# Patient Record
Sex: FEMALE | Race: WHITE | NOT HISPANIC OR LATINO | Employment: FULL TIME | URBAN - METROPOLITAN AREA
[De-identification: names, ages, dates, MRNs, and addresses within clinical notes are randomized per-mention and may not be internally consistent; named-entity substitution may affect disease eponyms.]

---

## 2017-09-27 ENCOUNTER — OFFICE VISIT (OUTPATIENT)
Dept: URGENT CARE | Facility: PHYSICIAN GROUP | Age: 37
End: 2017-09-27
Payer: COMMERCIAL

## 2017-09-27 VITALS
WEIGHT: 215 LBS | SYSTOLIC BLOOD PRESSURE: 124 MMHG | RESPIRATION RATE: 16 BRPM | DIASTOLIC BLOOD PRESSURE: 76 MMHG | HEART RATE: 87 BPM | TEMPERATURE: 98.7 F | OXYGEN SATURATION: 98 %

## 2017-09-27 DIAGNOSIS — J06.9 VIRAL UPPER RESPIRATORY TRACT INFECTION: ICD-10-CM

## 2017-09-27 PROCEDURE — 99203 OFFICE O/P NEW LOW 30 MIN: CPT | Performed by: FAMILY MEDICINE

## 2017-09-27 RX ORDER — AZITHROMYCIN 250 MG/1
TABLET, FILM COATED ORAL
Qty: 6 TAB | Refills: 0 | Status: SHIPPED | OUTPATIENT
Start: 2017-09-27 | End: 2017-12-24

## 2017-09-27 ASSESSMENT — ENCOUNTER SYMPTOMS
EYE DISCHARGE: 0
SENSORY CHANGE: 0
SORE THROAT: 1
NAUSEA: 0
FEVER: 0
COUGH: 1
VOMITING: 0
HEADACHES: 1
ABDOMINAL PAIN: 0
SPUTUM PRODUCTION: 1
CHILLS: 0
DIARRHEA: 0
WHEEZING: 1
PALPITATIONS: 0

## 2017-09-27 NOTE — PROGRESS NOTES
Subjective:      Татьяна Velez is a 37 y.o. female who presents with URI (x4days, aches, chest congestion, wheezing worse x1day)            Subjective:     Татьяна Velez is a 37 y.o. female who presents for CC of symptoms of a URI, possible sinusitis. Symptoms include sinus and nasal congestion, sore throat, nasal blockage, post nasal drip, bilateral sinus pain, wheezing, productive cough, cough described as productive and productive of green sputum and green nasal discharge. Onset of symptoms was 3 days ago, gradually worsening since that time. She is drinking plenty of fluids.. Evaluation to date: none. Treatment to date: decongestants, antihistamines, cough suppressants, which have been somewhat effective                        Review of Systems   Constitutional: Negative for chills, fever and malaise/fatigue.   HENT: Positive for congestion and sore throat. Negative for ear pain and nosebleeds.    Eyes: Negative for discharge.   Respiratory: Positive for cough, sputum production and wheezing.    Cardiovascular: Negative for chest pain and palpitations.   Gastrointestinal: Negative for abdominal pain, diarrhea, nausea and vomiting.   Genitourinary: Negative for dysuria and urgency.   Skin: Negative for itching and rash.   Neurological: Positive for headaches. Negative for sensory change.      PMH:  has no past medical history on file.  MEDS:   Current Outpatient Prescriptions:   •  azithromycin (ZITHROMAX Z-CASSIDY) 250 MG Tab, Use as directed with food, Disp: 6 Tab, Rfl: 0  •  hydrochlorothiazide (HYDRODIURIL) 25 MG Tab, Take 25 mg by mouth every day., Disp: , Rfl:   •  metoprolol (LOPRESSOR) 25 MG Tab, Take 25 mg by mouth 2 times a day., Disp: , Rfl:   •  azithromycin (ZITHROMAX) 250 MG Tab, Take as directed, Disp: 6 Tab, Rfl: 0  •  ibuprofen (MOTRIN) 800 MG Tab, Take 1 Tab by mouth every 8 hours as needed., Disp: 30 Tab, Rfl: 3  ALLERGIES:   Allergies   Allergen Reactions   • Sulfa Drugs      SURGHX: No past  surgical history on file.  SOCHX:  reports that she has never smoked. She has never used smokeless tobacco. She reports that she does not drink alcohol.  FH: Family history was reviewed, no pertinent findings to report         Objective:     /76   Pulse 87   Temp 37.1 °C (98.7 °F)   Resp 16   Wt 97.5 kg (215 lb)   SpO2 98%      Physical Exam   Constitutional: She is oriented to person, place, and time. She appears well-developed and well-nourished.   HENT:   Head: Normocephalic and atraumatic.   Right Ear: External ear normal.   Left Ear: External ear normal.   Eyes: EOM are normal. Pupils are equal, round, and reactive to light. Right eye exhibits no discharge. Left eye exhibits no discharge.   Neck: Normal range of motion. Neck supple.   Cardiovascular: Normal rate, regular rhythm and normal heart sounds.    Pulmonary/Chest: Effort normal and breath sounds normal. No respiratory distress. She has no wheezes.   Abdominal: Soft. Bowel sounds are normal.   Lymphadenopathy:     She has cervical adenopathy.   Neurological: She is alert and oriented to person, place, and time.   Skin: Skin is warm and dry.   Psychiatric: She has a normal mood and affect.               Assessment/Plan:     Assessment:    viral upper respiratory illness     Plan:    Discussed dx and tx of URIs  Discussed the importance of avoiding unnecessary abx therapy.  Suggested symptomatic OTC remedies.  Suggested supportive care with sinus rinses and decongestants PRN   Antibiotics per orders.  Call in 3 days if symptoms aren't resolving.    Influenza vaccine was offered to the pt. 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

## 2017-10-06 ENCOUNTER — HOSPITAL ENCOUNTER (OUTPATIENT)
Dept: LAB | Facility: MEDICAL CENTER | Age: 37
End: 2017-10-06
Attending: NURSE PRACTITIONER
Payer: COMMERCIAL

## 2017-10-06 ENCOUNTER — HOSPITAL ENCOUNTER (OUTPATIENT)
Dept: LAB | Facility: MEDICAL CENTER | Age: 37
End: 2017-10-06
Payer: COMMERCIAL

## 2017-10-06 LAB
ALBUMIN SERPL BCP-MCNC: 4.2 G/DL (ref 3.2–4.9)
ALBUMIN SERPL BCP-MCNC: 4.3 G/DL (ref 3.2–4.9)
ALBUMIN/GLOB SERPL: 1.2 G/DL
ALBUMIN/GLOB SERPL: 1.2 G/DL
ALP SERPL-CCNC: 90 U/L (ref 30–99)
ALP SERPL-CCNC: 93 U/L (ref 30–99)
ALT SERPL-CCNC: 14 U/L (ref 2–50)
ALT SERPL-CCNC: 15 U/L (ref 2–50)
ANION GAP SERPL CALC-SCNC: 7 MMOL/L (ref 0–11.9)
ANION GAP SERPL CALC-SCNC: 7 MMOL/L (ref 0–11.9)
AST SERPL-CCNC: 18 U/L (ref 12–45)
AST SERPL-CCNC: 19 U/L (ref 12–45)
BDY FAT % MEASURED: 42.7 %
BILIRUB SERPL-MCNC: 0.5 MG/DL (ref 0.1–1.5)
BILIRUB SERPL-MCNC: 0.6 MG/DL (ref 0.1–1.5)
BP DIAS: 80 MMHG
BP SYS: 118 MMHG
BUN SERPL-MCNC: 20 MG/DL (ref 8–22)
BUN SERPL-MCNC: 20 MG/DL (ref 8–22)
CALCIUM SERPL-MCNC: 10 MG/DL (ref 8.5–10.5)
CALCIUM SERPL-MCNC: 10 MG/DL (ref 8.5–10.5)
CHLORIDE SERPL-SCNC: 102 MMOL/L (ref 96–112)
CHLORIDE SERPL-SCNC: 103 MMOL/L (ref 96–112)
CHOLEST SERPL-MCNC: 165 MG/DL (ref 100–199)
CHOLEST SERPL-MCNC: 185 MG/DL (ref 100–199)
CO2 SERPL-SCNC: 28 MMOL/L (ref 20–33)
CO2 SERPL-SCNC: 28 MMOL/L (ref 20–33)
CREAT SERPL-MCNC: 0.73 MG/DL (ref 0.5–1.4)
CREAT SERPL-MCNC: 0.78 MG/DL (ref 0.5–1.4)
DIABETES HTDIA: NO
EVENT NAME HTEVT: NORMAL
GFR SERPL CREATININE-BSD FRML MDRD: >60 ML/MIN/1.73 M 2
GFR SERPL CREATININE-BSD FRML MDRD: >60 ML/MIN/1.73 M 2
GLOBULIN SER CALC-MCNC: 3.5 G/DL (ref 1.9–3.5)
GLOBULIN SER CALC-MCNC: 3.6 G/DL (ref 1.9–3.5)
GLUCOSE SERPL-MCNC: 82 MG/DL (ref 65–99)
GLUCOSE SERPL-MCNC: 84 MG/DL (ref 65–99)
HDLC SERPL-MCNC: 36 MG/DL
HDLC SERPL-MCNC: 38 MG/DL
HYPERTENSION HTHYP: YES
LDLC SERPL CALC-MCNC: 107 MG/DL
LDLC SERPL CALC-MCNC: 124 MG/DL
POTASSIUM SERPL-SCNC: 4.3 MMOL/L (ref 3.6–5.5)
POTASSIUM SERPL-SCNC: 4.4 MMOL/L (ref 3.6–5.5)
PROT SERPL-MCNC: 7.8 G/DL (ref 6–8.2)
PROT SERPL-MCNC: 7.8 G/DL (ref 6–8.2)
SCREENING LOC CITY HTCIT: NORMAL
SCREENING LOC STATE HTSTA: NORMAL
SCREENING LOCATION HTLOC: NORMAL
SMOKING HTSMO: NO
SODIUM SERPL-SCNC: 137 MMOL/L (ref 135–145)
SODIUM SERPL-SCNC: 138 MMOL/L (ref 135–145)
SUBSCRIBER ID HTSID: NORMAL
TRIGL SERPL-MCNC: 110 MG/DL (ref 0–149)
TRIGL SERPL-MCNC: 115 MG/DL (ref 0–149)

## 2017-10-06 PROCEDURE — 80053 COMPREHEN METABOLIC PANEL: CPT

## 2017-10-06 PROCEDURE — 80061 LIPID PANEL: CPT

## 2017-10-06 PROCEDURE — 36415 COLL VENOUS BLD VENIPUNCTURE: CPT

## 2017-10-27 ENCOUNTER — TELEMEDICINE2 (OUTPATIENT)
Dept: URGENT CARE | Facility: CLINIC | Age: 37
End: 2017-10-27
Payer: COMMERCIAL

## 2017-10-27 DIAGNOSIS — H10.32 ACUTE BACTERIAL CONJUNCTIVITIS OF LEFT EYE: ICD-10-CM

## 2017-10-27 PROCEDURE — 99214 OFFICE O/P EST MOD 30 MIN: CPT | Mod: GT | Performed by: PHYSICIAN ASSISTANT

## 2017-10-27 RX ORDER — POLYMYXIN B SULFATE AND TRIMETHOPRIM 1; 10000 MG/ML; [USP'U]/ML
1 SOLUTION OPHTHALMIC 4 TIMES DAILY
Qty: 10 ML | Refills: 0 | Status: SHIPPED | OUTPATIENT
Start: 2017-10-27 | End: 2019-01-23

## 2017-10-27 RX ORDER — POLYMYXIN B SULFATE AND TRIMETHOPRIM 1; 10000 MG/ML; [USP'U]/ML
1 SOLUTION OPHTHALMIC EVERY 4 HOURS
Qty: 10 ML | Refills: 0 | Status: CANCELLED
Start: 2017-10-27 | End: 2017-11-01

## 2017-10-27 ASSESSMENT — ENCOUNTER SYMPTOMS
VISUAL CHANGE: 0
PHOTOPHOBIA: 1
NEUROLOGICAL NEGATIVE: 1
SPUTUM PRODUCTION: 0
DOUBLE VISION: 0
EYE REDNESS: 1
VOMITING: 0
BLURRED VISION: 0
SHORTNESS OF BREATH: 0
NAUSEA: 0
SORE THROAT: 0
EYE PAIN: 1
EYE DISCHARGE: 1
COUGH: 0
CHILLS: 0
FEVER: 0

## 2017-10-27 NOTE — PROGRESS NOTES
Subjective:      Татьяна Velez is a 37 y.o. female who presents with Conjunctivitis            This encounter was completed using secure and encrypted videoconferencing equipment, the patient gave consent and patient identification was confirmed.          Conjunctivitis   This is a new problem. The current episode started yesterday. The problem occurs constantly. The problem has been gradually worsening. Pertinent negatives include no chills, congestion, coughing, fever, nausea, sore throat, visual change or vomiting. Exacerbated by: sunlight. She has tried nothing for the symptoms.   History of contact lens use with regular disposal of lenses every 2 weeks.  Denies extended use of contacts.  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      Review of Systems   Constitutional: Negative for chills and fever.   HENT: Negative for congestion and sore throat.    Eyes: Positive for photophobia, pain, discharge and redness. Negative for blurred vision and double vision.   Respiratory: Negative for cough, sputum production and shortness of breath.    Gastrointestinal: Negative for nausea and vomiting.   Neurological: Negative.           Objective:     There were no vitals taken for this visit.     Physical Exam   Constitutional: She is oriented to person, place, and time. She appears well-developed and well-nourished.   Patient is well appearing.     Eyes:   Left sclera is erythematous, mild swelling of the conjunctiva of the left eye.  Mild erythema of the right sclera.   Pulmonary/Chest:   No evidence of respiratory distress, speaking in full sentences.     Neurological: She is alert and oriented to person, place, and time.   Skin: No rash noted.   Psychiatric: She has a normal mood and affect. Her behavior is normal. Judgment and thought content normal.               Assessment/Plan:     1. Acute bacterial conjunctivitis of left eye  Exam and history is consistent with conjunctivitis.  Will treat with  abx eye drops.  Discussed disposal of contact lenses and eye makeup.  Abx as prescribed, start treating right eye if symptoms develop.  Follow-up if symptoms change, get worse or new symptoms develop.    - polymixin-trimethoprim (POLYTRIM) 27253-8.1 UNIT/ML-% Solution; Place 1 Drop in left eye 4 times a day.  Dispense: 10 mL; Refill: 0

## 2017-12-24 ENCOUNTER — OFFICE VISIT (OUTPATIENT)
Dept: URGENT CARE | Facility: PHYSICIAN GROUP | Age: 37
End: 2017-12-24
Payer: COMMERCIAL

## 2017-12-24 VITALS
HEIGHT: 66 IN | WEIGHT: 230 LBS | DIASTOLIC BLOOD PRESSURE: 70 MMHG | SYSTOLIC BLOOD PRESSURE: 124 MMHG | TEMPERATURE: 99.4 F | BODY MASS INDEX: 36.96 KG/M2 | OXYGEN SATURATION: 98 % | RESPIRATION RATE: 16 BRPM | HEART RATE: 88 BPM

## 2017-12-24 DIAGNOSIS — E66.9 OBESITY (BMI 35.0-39.9 WITHOUT COMORBIDITY): ICD-10-CM

## 2017-12-24 DIAGNOSIS — J01.00 ACUTE NON-RECURRENT MAXILLARY SINUSITIS: ICD-10-CM

## 2017-12-24 PROCEDURE — 99214 OFFICE O/P EST MOD 30 MIN: CPT | Performed by: PHYSICIAN ASSISTANT

## 2017-12-24 RX ORDER — AMOXICILLIN AND CLAVULANATE POTASSIUM 875; 125 MG/1; MG/1
1 TABLET, FILM COATED ORAL 2 TIMES DAILY
Qty: 14 TAB | Refills: 0 | Status: SHIPPED | OUTPATIENT
Start: 2017-12-24 | End: 2017-12-31

## 2017-12-24 ASSESSMENT — ENCOUNTER SYMPTOMS
CHILLS: 0
HEADACHES: 1
SINUS PAIN: 1
CARDIOVASCULAR NEGATIVE: 1
SORE THROAT: 1
SWOLLEN GLANDS: 1
RESPIRATORY NEGATIVE: 1
FEVER: 0
GASTROINTESTINAL NEGATIVE: 1
DIZZINESS: 0
MYALGIAS: 0
SINUS PRESSURE: 1

## 2017-12-24 NOTE — PROGRESS NOTES
Subjective:      Татьяна Velez is a 37 y.o. female who presents with Sinus Problem (x 1 week; sinus congestion, green discharge, sinus headache and painful pressure)            Sinus Problem   This is a new problem. The current episode started 1 to 4 weeks ago. The problem has been gradually worsening since onset. There has been no fever. The fever has been present for less than 1 day. Her pain is at a severity of 7/10. The pain is moderate. Associated symptoms include congestion, headaches, sinus pressure, a sore throat and swollen glands. Pertinent negatives include no chills or ear pain. Past treatments include oral decongestants and acetaminophen. The treatment provided mild relief.       PMH:  has no past medical history on file.  MEDS:   Current Outpatient Prescriptions:   •  hydrochlorothiazide (HYDRODIURIL) 25 MG Tab, Take 25 mg by mouth every day., Disp: , Rfl:   •  metoprolol (LOPRESSOR) 25 MG Tab, Take 25 mg by mouth 2 times a day., Disp: , Rfl:   •  polymixin-trimethoprim (POLYTRIM) 68801-0.1 UNIT/ML-% Solution, Place 1 Drop in left eye 4 times a day. (Patient not taking: Reported on 12/24/2017), Disp: 10 mL, Rfl: 0  •  azithromycin (ZITHROMAX Z-CASSIDY) 250 MG Tab, Use as directed with food (Patient not taking: Reported on 12/24/2017), Disp: 6 Tab, Rfl: 0  •  azithromycin (ZITHROMAX) 250 MG Tab, Take as directed (Patient not taking: Reported on 12/24/2017), Disp: 6 Tab, Rfl: 0  •  ibuprofen (MOTRIN) 800 MG Tab, Take 1 Tab by mouth every 8 hours as needed. (Patient not taking: Reported on 12/24/2017), Disp: 30 Tab, Rfl: 3  ALLERGIES:   Allergies   Allergen Reactions   • Sulfa Drugs      SURGHX: No past surgical history on file.  SOCHX:  reports that she has never smoked. She has never used smokeless tobacco. She reports that she does not drink alcohol.  FH: family history is not on file.    Review of Systems   Constitutional: Negative for chills and fever.   HENT: Positive for congestion, sinus pain,  "sinus pressure and sore throat. Negative for ear pain.    Respiratory: Negative.    Cardiovascular: Negative.    Gastrointestinal: Negative.    Musculoskeletal: Negative for joint pain and myalgias.   Neurological: Positive for headaches. Negative for dizziness.       Medications, Allergies, and current problem list reviewed today in Epic     Objective:     /70   Pulse 88   Temp 37.4 °C (99.4 °F)   Resp 16   Ht 1.676 m (5' 6\")   Wt 104.3 kg (230 lb)   SpO2 98%   BMI 37.12 kg/m²      Physical Exam   Constitutional: She is oriented to person, place, and time. She appears well-developed and well-nourished. No distress.   HENT:   Head: Normocephalic and atraumatic.   Right Ear: Hearing, tympanic membrane, external ear and ear canal normal. Tympanic membrane is not erythematous. No decreased hearing is noted.   Left Ear: Hearing, tympanic membrane, external ear and ear canal normal. Tympanic membrane is not erythematous. No decreased hearing is noted.   Nose: Right sinus exhibits maxillary sinus tenderness. Left sinus exhibits maxillary sinus tenderness.   Mouth/Throat: Normal dentition. Posterior oropharyngeal erythema present. No oropharyngeal exudate.   Eyes: Conjunctivae and EOM are normal. Pupils are equal, round, and reactive to light. Right eye exhibits no discharge. Left eye exhibits no discharge. No scleral icterus.   Neck: Normal range of motion. Neck supple.   Cardiovascular: Normal rate, regular rhythm and normal heart sounds.    No murmur heard.  Pulmonary/Chest: Effort normal and breath sounds normal. No respiratory distress. She has no wheezes.   Lymphadenopathy:        Head (right side): Submandibular adenopathy present.        Head (left side): Submandibular adenopathy present.     She has no cervical adenopathy.        Right cervical: No superficial cervical adenopathy present.       Left cervical: No superficial cervical adenopathy present.   Neurological: She is alert and oriented to " person, place, and time.   Skin: Skin is warm, dry and intact. She is not diaphoretic. No cyanosis. Nails show no clubbing.   Psychiatric: She has a normal mood and affect. Her behavior is normal. Judgment and thought content normal.   Nursing note and vitals reviewed.              Assessment/Plan:     1. Acute non-recurrent maxillary sinusitis  amoxicillin-clavulanate (AUGMENTIN) 875-125 MG Tab   2. Obesity (BMI 35.0-39.9 without comorbidity)  Patient identified as having weight management issue.  Appropriate orders and counseling given.     Take full course of antibiotic  Tylenol and Motrin for fever and pain  OTC meds as discussed including oral decongestant if tolerated  Increase fluids and rest  Nasal spray, nasal wash, Madai pot    Return to clinic or go to ED if symptoms worsen or persist. Indications for ED discussed at length. Patient voices understanding. Follow-up with your primary care provider in 3-5 days. Red flags discussed.    Please note that this dictation was created using voice recognition software. I have made every reasonable attempt to correct obvious errors, but I expect that there are errors of grammar and possibly content that I did not discover before finalizing the note.

## 2018-04-28 ENCOUNTER — OFFICE VISIT (OUTPATIENT)
Dept: URGENT CARE | Facility: PHYSICIAN GROUP | Age: 38
End: 2018-04-28
Payer: COMMERCIAL

## 2018-04-28 ENCOUNTER — HOSPITAL ENCOUNTER (OUTPATIENT)
Dept: RADIOLOGY | Facility: MEDICAL CENTER | Age: 38
End: 2018-04-28
Attending: FAMILY MEDICINE
Payer: COMMERCIAL

## 2018-04-28 VITALS
HEART RATE: 69 BPM | DIASTOLIC BLOOD PRESSURE: 78 MMHG | OXYGEN SATURATION: 98 % | WEIGHT: 212 LBS | HEIGHT: 66 IN | BODY MASS INDEX: 34.07 KG/M2 | TEMPERATURE: 99.1 F | RESPIRATION RATE: 14 BRPM | SYSTOLIC BLOOD PRESSURE: 126 MMHG

## 2018-04-28 DIAGNOSIS — M79.671 RIGHT FOOT PAIN: ICD-10-CM

## 2018-04-28 DIAGNOSIS — M77.50 TENDONITIS OF FOOT: ICD-10-CM

## 2018-04-28 PROCEDURE — 99213 OFFICE O/P EST LOW 20 MIN: CPT | Performed by: FAMILY MEDICINE

## 2018-04-28 PROCEDURE — 73630 X-RAY EXAM OF FOOT: CPT | Mod: RT

## 2018-04-28 ASSESSMENT — ENCOUNTER SYMPTOMS
CHILLS: 0
FEVER: 0
ROS SKIN COMMENTS: NO ABRASION OR LACERATION
SENSORY CHANGE: 0
FOCAL WEAKNESS: 0

## 2018-04-28 NOTE — PROGRESS NOTES
"Subjective:      Татьяна Velez is a 38 y.o. female who presents with Foot Pain (R foot pain x 1 week)            1 week right forefoot pain. Waxing and waning. Worse with weight bearing. No clear trigger or trauma but has increased exercise/gym over the last month.  Has tried different footwear. Min relief with OTC nsaid. No other aggravating or alleviating factors.          Review of Systems   Constitutional: Negative for chills and fever.   Musculoskeletal: Negative for joint pain.   Skin:        No abrasion or laceration     Neurological: Negative for sensory change and focal weakness.     .  Medications, Allergies, and current problem list reviewed today in Epic       Objective:     /78   Pulse 69   Temp 37.3 °C (99.1 °F)   Resp 14   Ht 1.676 m (5' 6\")   Wt 96.2 kg (212 lb)   SpO2 98%   BMI 34.22 kg/m²      Physical Exam   Constitutional: She appears well-developed and well-nourished. No distress.   Musculoskeletal:        Feet:    Skin: Skin is warm and dry. No rash noted.               Assessment/Plan:   Xray negative per radiology    1. Right foot pain  DX-FOOT-COMPLETE 3+ RIGHT   2. Tendonitis of foot       Differential diagnosis, natural history, supportive care, and indications for immediate follow-up discussed at length.     Suspect tendonitis. Ddx includes occult fracture and Tiwari's neuroma.     Relative rest, ice, nsaid prn. Elevation and compression prn swelling. Resume activity as tolerated.  Walking boot prn  "

## 2018-09-16 ENCOUNTER — OFFICE VISIT (OUTPATIENT)
Dept: URGENT CARE | Facility: PHYSICIAN GROUP | Age: 38
End: 2018-09-16
Payer: COMMERCIAL

## 2018-09-16 VITALS
HEART RATE: 100 BPM | WEIGHT: 210 LBS | DIASTOLIC BLOOD PRESSURE: 80 MMHG | SYSTOLIC BLOOD PRESSURE: 120 MMHG | BODY MASS INDEX: 33.75 KG/M2 | HEIGHT: 66 IN | RESPIRATION RATE: 16 BRPM | OXYGEN SATURATION: 100 % | TEMPERATURE: 97.9 F

## 2018-09-16 DIAGNOSIS — J01.40 ACUTE PANSINUSITIS, RECURRENCE NOT SPECIFIED: ICD-10-CM

## 2018-09-16 DIAGNOSIS — H10.31 ACUTE BACTERIAL CONJUNCTIVITIS OF RIGHT EYE: ICD-10-CM

## 2018-09-16 PROCEDURE — 99214 OFFICE O/P EST MOD 30 MIN: CPT | Performed by: PHYSICIAN ASSISTANT

## 2018-09-16 RX ORDER — AMOXICILLIN AND CLAVULANATE POTASSIUM 875; 125 MG/1; MG/1
1 TABLET, FILM COATED ORAL 2 TIMES DAILY
Qty: 14 TAB | Refills: 0 | Status: SHIPPED | OUTPATIENT
Start: 2018-09-16 | End: 2018-09-23

## 2018-09-16 RX ORDER — GUAIFENESIN 600 MG/1
600 TABLET, EXTENDED RELEASE ORAL EVERY 12 HOURS
COMMUNITY
End: 2019-01-23

## 2018-09-16 RX ORDER — POLYMYXIN B SULFATE AND TRIMETHOPRIM 1; 10000 MG/ML; [USP'U]/ML
SOLUTION OPHTHALMIC
Qty: 5 ML | Refills: 0 | Status: SHIPPED | OUTPATIENT
Start: 2018-09-16 | End: 2019-01-23

## 2018-09-16 RX ORDER — CIPROFLOXACIN HYDROCHLORIDE 3.5 MG/ML
1 SOLUTION/ DROPS TOPICAL EVERY 4 HOURS
Qty: 1 BOTTLE | Refills: 0 | Status: SHIPPED | OUTPATIENT
Start: 2018-09-16 | End: 2018-09-23

## 2018-09-16 RX ORDER — CIPROFLOXACIN HYDROCHLORIDE 3.5 MG/ML
1 SOLUTION/ DROPS TOPICAL EVERY 4 HOURS
Qty: 1 BOTTLE | Refills: 0 | Status: SHIPPED | OUTPATIENT
Start: 2018-09-16 | End: 2018-09-16 | Stop reason: SDUPTHER

## 2018-09-16 ASSESSMENT — ENCOUNTER SYMPTOMS
SORE THROAT: 1
SINUS PAIN: 1
SWOLLEN GLANDS: 0
CHILLS: 1
BLURRED VISION: 0
EYE DISCHARGE: 1
EYE PAIN: 0
COUGH: 1
DOUBLE VISION: 0
VOMITING: 0
HEADACHES: 1
FEVER: 0
TINGLING: 0
MYALGIAS: 0
WHEEZING: 0
EYE REDNESS: 1
DIARRHEA: 0
DIZZINESS: 0
PHOTOPHOBIA: 0
SINUS PRESSURE: 1
NECK PAIN: 0
ABDOMINAL PAIN: 0

## 2018-09-16 ASSESSMENT — PAIN SCALES - GENERAL: PAINLEVEL: NO PAIN

## 2018-09-16 NOTE — PROGRESS NOTES
"Subjective:      Татьяна Velez is a 38 y.o. female who presents with Sinus Problem (sinus [id number] , R eye [id number] )            Sinus Problem   This is a new problem. Episode onset: Worse the last 5 days.  The problem has been gradually worsening since onset. There has been no fever. Her pain is at a severity of 5/10. The pain is moderate. Associated symptoms include chills, congestion, coughing, headaches, sinus pressure and a sore throat. Pertinent negatives include no ear pain, neck pain or swollen glands. (Right eye drainage and crusting. ) Treatments tried: Mucinex, [place]. The treatment provided mild relief.       Review of Systems   Constitutional: Positive for chills and malaise/fatigue. Negative for fever.   HENT: Positive for congestion, sinus pain, sinus pressure and sore throat. Negative for ear discharge and ear pain.         Pos. For ear pressure     Eyes: Positive for discharge and redness. Negative for blurred vision, double vision, photophobia and pain.   Respiratory: Positive for cough. Negative for wheezing.    Cardiovascular: Negative for chest pain and leg swelling.   Gastrointestinal: Negative for abdominal pain, diarrhea and vomiting.   Genitourinary: Negative for dysuria and urgency.   Musculoskeletal: Negative for myalgias and neck pain.   Skin: Negative for itching and rash.   Neurological: Positive for headaches. Negative for dizziness and tingling.   All other systems reviewed and are negative.         Objective:     /80   Pulse 100   Temp 36.6 °C (97.9 °F)   Resp 16   Ht 1.676 m (5' 6\")   Wt 95.3 kg (210 lb)   SpO2 100%   BMI 33.89 kg/m²    PMH:  has no past medical history on file.  MEDS:   Current Outpatient Prescriptions:   •  aspirin effervescent (JONATHAN-SELTZER) 325 MG Effer Tab, Take 1 Tab by mouth every 6 hours as needed., Disp: , Rfl:   •  guaiFENesin LA (MUCINEX) 600 MG TABLET SR 12 HR, Take 600 mg by mouth every 12 hours., Disp: , Rfl:   •  " amoxicillin-clavulanate (AUGMENTIN) 875-125 MG Tab, Take 1 Tab by mouth 2 times a day for 7 days., Disp: 14 Tab, Rfl: 0  •  ciprofloxacin (CILOXIN) 0.3 % Solution, Place 1 Drop in right eye every 4 hours for 7 days., Disp: 1 Bottle, Rfl: 0  •  hydrochlorothiazide (HYDRODIURIL) 25 MG Tab, Take 25 mg by mouth every day., Disp: , Rfl:   •  metoprolol (LOPRESSOR) 25 MG Tab, Take 25 mg by mouth 2 times a day., Disp: , Rfl:   •  ibuprofen (MOTRIN) 800 MG Tab, Take 1 Tab by mouth every 8 hours as needed., Disp: 30 Tab, Rfl: 3  •  polymixin-trimethoprim (POLYTRIM) 13418-7.1 UNIT/ML-% Solution, Place 1 Drop in left eye 4 times a day. (Patient not taking: Reported on 12/24/2017), Disp: 10 mL, Rfl: 0  ALLERGIES:   Allergies   Allergen Reactions   • Sulfa Drugs      SURGHX: No past surgical history on file.  SOCHX:  reports that she has never smoked. She has never used smokeless tobacco. She reports that she does not drink alcohol.  FH: Family history was reviewed, no pertinent findings to report    Physical Exam   Constitutional: She is oriented to person, place, and time. She appears well-developed and well-nourished.   HENT:   Head: Normocephalic and atraumatic.   Mouth/Throat: No oropharyngeal exudate.   Bilateral clear effusions- without bulge or erythema to the TM.   Posterior oropharynx with pos. PND without tonsillar edema or erythema.   Nose- boggy turbinates with mild-moderate amount of nasal discharge. Bilateral maxillary sinus tenderness with percussion.    Eyes: Pupils are equal, round, and reactive to light. EOM are normal. Right eye exhibits discharge. Right conjunctiva is injected.       Noted green-purulent discharge to lower eyelid   Neck: Normal range of motion. Neck supple.   Cardiovascular: Normal rate and regular rhythm.    Pulmonary/Chest: Effort normal and breath sounds normal. No respiratory distress. She has no wheezes.   Musculoskeletal: Normal range of motion. She exhibits no edema.    Lymphadenopathy:     She has no cervical adenopathy.   Neurological: She is alert and oriented to person, place, and time.   Skin: Skin is warm. No rash noted.   Psychiatric: She has a normal mood and affect. Her behavior is normal.   Vitals reviewed.              Assessment/Plan:     1. Acute pansinusitis, recurrence not specified  - amoxicillin-clavulanate (AUGMENTIN) 875-125 MG Tab; Take 1 Tab by mouth 2 times a day for 7 days.  Dispense: 14 Tab; Refill: 0    2. Uses contact lenses  - ciprofloxacin (CILOXIN) 0.3 % Solution; Place 1 Drop in right eye every 4 hours for 7 days.  Dispense: 1 Bottle; Refill: 0    3. Acute bacterial conjunctivitis of right eye    Due to duration of symptoms, sinus tenderness, and failure of OTC therapies- ABX was written to tx. For bacterial etiology for sinusitis.   Continue OTC supportive therapies- Flonase, OTC allergy meds, avoid night time dairy. Increase fluids. Humidification.  Ciprofloxacin was used as patient utilizes contact lenses.  Patient must avoid contact lens use until the duration of symptoms resolved.  Recent signs and symptoms of red eye were discussed today of which would require emergent follow-up.  Patient given precautionary s/sx that mandate immediate follow up and evaluation in the ED. Advised of risks of not doing so.    DDX, Supportive care, and indications for immediate follow-up discussed with patient.    Instructed to return to clinic or nearest emergency department if we are not available for any change in condition, further concerns, or worsening of symptoms.    The patient demonstrated a good understanding and agreed with the treatment plan

## 2018-10-04 ENCOUNTER — HOSPITAL ENCOUNTER (OUTPATIENT)
Facility: MEDICAL CENTER | Age: 38
End: 2018-10-04
Payer: COMMERCIAL

## 2018-10-04 LAB
BDY FAT % MEASURED: 41.3 %
BP DIAS: 78 MMHG
BP SYS: 120 MMHG
DIABETES HTDIA: NO
EVENT NAME HTEVT: NORMAL
HYPERTENSION HTHYP: YES
SCREENING LOC CITY HTCIT: NORMAL
SCREENING LOC STATE HTSTA: NORMAL
SCREENING LOCATION HTLOC: NORMAL
SUBSCRIBER ID HTSID: NORMAL

## 2018-10-05 LAB
CHOLEST SERPL-MCNC: 177 MG/DL (ref 100–199)
FASTING STATUS PATIENT QL REPORTED: NORMAL
GLUCOSE SERPL-MCNC: 95 MG/DL (ref 65–99)
HDLC SERPL-MCNC: 36 MG/DL
LDLC SERPL CALC-MCNC: 121 MG/DL
TRIGL SERPL-MCNC: 100 MG/DL (ref 0–149)

## 2019-01-03 ENCOUNTER — TELEPHONE (OUTPATIENT)
Dept: SCHEDULING | Facility: IMAGING CENTER | Age: 39
End: 2019-01-03

## 2019-01-23 ENCOUNTER — OFFICE VISIT (OUTPATIENT)
Dept: INTERNAL MEDICINE | Facility: MEDICAL CENTER | Age: 39
End: 2019-01-23
Payer: COMMERCIAL

## 2019-01-23 VITALS
WEIGHT: 219.6 LBS | HEIGHT: 66 IN | BODY MASS INDEX: 35.29 KG/M2 | DIASTOLIC BLOOD PRESSURE: 84 MMHG | TEMPERATURE: 99.7 F | OXYGEN SATURATION: 96 % | SYSTOLIC BLOOD PRESSURE: 115 MMHG

## 2019-01-23 DIAGNOSIS — I10 ESSENTIAL HYPERTENSION: ICD-10-CM

## 2019-01-23 DIAGNOSIS — Z00.00 HEALTH CARE MAINTENANCE: ICD-10-CM

## 2019-01-23 DIAGNOSIS — E66.9 OBESITY (BMI 35.0-39.9 WITHOUT COMORBIDITY): ICD-10-CM

## 2019-01-23 PROCEDURE — 99204 OFFICE O/P NEW MOD 45 MIN: CPT | Performed by: INTERNAL MEDICINE

## 2019-01-23 RX ORDER — OLOPATADINE HYDROCHLORIDE 7 MG/ML
SOLUTION OPHTHALMIC
Refills: 6 | COMMUNITY
Start: 2019-01-04 | End: 2020-05-14 | Stop reason: CLARIF

## 2019-01-23 RX ORDER — HYDROCHLOROTHIAZIDE 25 MG/1
12.5 TABLET ORAL DAILY
Qty: 30 TAB | Refills: 1 | Status: SHIPPED | OUTPATIENT
Start: 2019-01-23 | End: 2019-03-19

## 2019-01-23 ASSESSMENT — PATIENT HEALTH QUESTIONNAIRE - PHQ9: CLINICAL INTERPRETATION OF PHQ2 SCORE: 0

## 2019-01-23 ASSESSMENT — ENCOUNTER SYMPTOMS
GASTROINTESTINAL NEGATIVE: 1
CONSTITUTIONAL NEGATIVE: 1
RESPIRATORY NEGATIVE: 1
MUSCULOSKELETAL NEGATIVE: 1
PSYCHIATRIC NEGATIVE: 1
CARDIOVASCULAR NEGATIVE: 1
NEUROLOGICAL NEGATIVE: 1
EYES NEGATIVE: 1

## 2019-01-23 ASSESSMENT — PAIN SCALES - GENERAL: PAINLEVEL: NO PAIN

## 2019-01-23 NOTE — PROGRESS NOTES
New Patient to Establish    Reason to establish: New patient to establish    CC: Medication refill    HPI:     68-year-old female patient with past medical history of hypertension and obesity, patient presented the clinic today in order to establish care, patient stated that she would be having an appointment with the primary care provider to establish with after 2 months when she has a follow-up appointment that is why she presented to the clinic today in order to get her blood pressure medications refilled.     Hypertension:  -Diagnosed 2011, she has been on hydrocodone thiazide 12.5 mg daily along with metoprolol 25 mg daily, initially diagnosed hypertension at that time she presented to [facility] her blood pressure was found to be high and she had workup per patient most likely for secondary hypertension, stated that she had imaging study to her chest heart along with blood work.  Records from nursing by the [facility] requested.  She does have a blood pressure machine but she does not keep blood pressure log, her blood pressure today 115/84.  She denies headache, nausea, vomiting, double vision, shortness of breath or leg pain.  She has lost 15 pounds over the last 1 months intentionally.    Obesity:  -BMI 35.4, patient has lost 15 pounds intentionally over the last 1 month.  Denies cold intolerance, fatigue, dry skin or hair falling.  Has regular bowel movement.  Positive family history of obesity.     Patient Active Problem List    Diagnosis Date Noted   • Essential hypertension 01/23/2019   • Obesity (BMI 35.0-39.9 without comorbidity) (Formerly [facility]) 12/24/2017       Past Medical History:   Diagnosis Date   • Hypertension        Current Outpatient Prescriptions   Medication Sig Dispense Refill   • hydroCHLOROthiazide (HYDRODIURIL) 25 MG Tab Take 0.5 Tabs by mouth every day. 30 Tab 1   • metoprolol (LOPRESSOR) 25 MG Tab Take 1 Tab by mouth 2 times a day. 15 Tab 0   • PAZEO 0.7 % Solution  "INSTILL 1 DROP INTO BOTH EYES EVERY MORNING AS DIRECTED  6   • ibuprofen (MOTRIN) 800 MG Tab Take 1 Tab by mouth every 8 hours as needed. 30 Tab 3     No current facility-administered medications for this visit.        Allergies as of 01/23/2019 - Reviewed 01/23/2019   Allergen Reaction Noted   • Sulfa drugs  02/13/2016       Social History     Social History   • Marital status:      Spouse name: N/A   • Number of children: N/A   • Years of education: N/A     Occupational History   • Not on file.     Social History Main Topics   • Smoking status: Never Smoker   • Smokeless tobacco: Never Used   • Alcohol use No   • Drug use: Unknown   • Sexual activity: No     Other Topics Concern   • Not on file     Social History Narrative   • No narrative on file       Family History   Problem Relation Age of Onset   • Heart Disease Maternal Grandmother    • Heart Disease Paternal Grandfather        History reviewed. No pertinent surgical history.    ROS: As per HPI. Additional pertinent systems as noted below.    Constitutional: No fever or chills   Review of Systems   Constitutional: Negative.    HENT: Negative.    Eyes: Negative.    Respiratory: Negative.    Cardiovascular: Negative.    Gastrointestinal: Negative.    Genitourinary: Negative.    Musculoskeletal: Negative.    Skin: Negative.    Neurological: Negative.    Psychiatric/Behavioral: Negative.        /84 (BP Location: Left arm, Patient Position: Sitting)   Temp 37.6 °C (99.7 °F) (Temporal)   Ht 1.676 m (5' 6\")   Wt 99.6 kg (219 lb 9.6 oz)   SpO2 96%   BMI 35.44 kg/m²     Physical Exam  General:  Alert and oriented, No apparent distress.  Obese patient, pleasant.   Eyes: Pupils equal and reactive. No scleral icterus.  Throat: Clear no erythema or exudates noted.  Neck: Supple. No lymphadenopathy noted. Thyroid not enlarged.  Scar of huge birthmark removal at the back of neck.   Lungs: Clear to auscultation and percussion bilaterally.  Cardiovascular: " Regular rate and rhythm. No murmurs, rubs or gallops.  Abdomen:  Benign. No rebound or guarding noted.  Extremities: No clubbing, cyanosis, edema.  Skin: Clear. No rash or suspicious skin lesions noted.  Neurological examination: Cranial nerves 2-12 grossly intact, muscle power 5/5 both upper and lower extremities.     Assessment and Plan    1. Essential hypertension  -Diagnosed since 2011, patient had workup for secondary hypertension at [facility] (normal per patient), will get records next visit  -Patient has been on hydrochlorothiazide 12.5 mg daily along with metoprolol 25 mg once daily  -Patient has blood pressure machine at home but does not keep blood pressure log, patient has lost 15 pounds over the last 1 months intentionally, her blood pressure today is 115/84.  Been compliant with.  Denies any headache, nausea, change of breath, leg swelling.   -CBC, CMP, hemoglobin A1c, lipid profile and thyroid function test ordered.   -Impression this patient less than 140/90.  Target may change based on the requested labs.   -Since her blood pressure on today's visit 150/84 and she lost 15 pounds over the last 1 months intentionally and she is further motivated to lose more weight, will wean her off metoprolol over 10 days, continuing hydrochlorothiazide with the current dose, bring blood pressure log next visit if above target consider adding ACE inhibitors instead was possible,combined hydrochlorothiazide and lisinopril pill.   -Advised to keep blood pressure log    2. Obesity (BMI 35.0-39.9 without comorbidity) (Formerly [facility])  -BMI 35.4, patient has lost 15 pounds over the last 1 month intentionally, she is motivated to lose more weight and she reaches target.   -Thyroid function test ordered,  patient declined referral to dietitian    3. 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     Follow-up of blood pressure after 2 weeks.     Signed by: Faizan Velazquez M.D.

## 2019-01-23 NOTE — PATIENT INSTRUCTIONS
- Please bring blood log with you next visit.   - Please take Metoprolol twice daily for 3 days then once daily for 3 days then once daily every other day for 3 days then discontinue.    Hypertension  Hypertension, commonly called high blood pressure, is when the force of blood pumping through your arteries is too strong. Your arteries are the blood vessels that carry blood from your heart throughout your body. A blood pressure reading consists of a higher number over a lower number, such as 110/72. The higher number (systolic) is the pressure inside your arteries when your heart pumps. The lower number (diastolic) is the pressure inside your arteries when your heart relaxes. Ideally you want your blood pressure below 120/80.  Hypertension forces your heart to work harder to pump blood. Your arteries may become narrow or stiff. Having untreated or uncontrolled hypertension can cause heart attack, stroke, kidney disease, and other problems.  What increases the risk?  Some risk factors for high blood pressure are controllable. Others are not.  Risk factors you cannot control include:  · Race. You may be at higher risk if you are .  · Age. Risk increases with age.  · Gender. Men are at higher risk than women before age 45 years. After age 65, women are at higher risk than men.  Risk factors you can control include:  · Not getting enough exercise or physical activity.  · Being overweight.  · Getting too much fat, sugar, calories, or salt in your diet.  · Drinking too much alcohol.  What are the signs or symptoms?  Hypertension does not usually cause signs or symptoms. Extremely high blood pressure (hypertensive crisis) may cause headache, anxiety, shortness of breath, and nosebleed.  How is this diagnosed?  To check if you have hypertension, your health care provider will measure your blood pressure while you are seated, with your arm held at the level of your heart. It should be measured at least twice  using the same arm. Certain conditions can cause a difference in blood pressure between your right and left arms. A blood pressure reading that is higher than normal on one occasion does not mean that you need treatment. If it is not clear whether you have high blood pressure, you may be asked to return on a different day to have your blood pressure checked again. Or, you may be asked to monitor your blood pressure at home for 1 or more weeks.  How is this treated?  Treating high blood pressure includes making lifestyle changes and possibly taking medicine. Living a healthy lifestyle can help lower high blood pressure. You may need to change some of your habits.  Lifestyle changes may include:  · Following the DASH diet. This diet is high in fruits, vegetables, and whole grains. It is low in salt, red meat, and added sugars.  · Keep your sodium intake below 2,300 mg per day.  · Getting at least 30-45 minutes of aerobic exercise at least 4 times per week.  · Losing weight if necessary.  · Not smoking.  · Limiting alcoholic beverages.  · Learning ways to reduce stress.  Your health care provider may prescribe medicine if lifestyle changes are not enough to get your blood pressure under control, and if one of the following is true:  · You are 18-59 years of age and your systolic blood pressure is above 140.  · You are 60 years of age or older, and your systolic blood pressure is above 150.  · Your diastolic blood pressure is above 90.  · You have diabetes, and your systolic blood pressure is over 140 or your diastolic blood pressure is over 90.  · You have kidney disease and your blood pressure is above 140/90.  · You have heart disease and your blood pressure is above 140/90.  Your personal target blood pressure may vary depending on your medical conditions, your age, and other factors.  Follow these instructions at home:  · Have your blood pressure rechecked as directed by your health care provider.  · Take medicines  only as directed by your health care provider. Follow the directions carefully. Blood pressure medicines must be taken as prescribed. The medicine does not work as well when you skip doses. Skipping doses also puts you at risk for problems.  · Do not smoke.  · Monitor your blood pressure at home as directed by your health care provider.  Contact a health care provider if:  · You think you are having a reaction to medicines taken.  · You have recurrent headaches or feel dizzy.  · You have swelling in your ankles.  · You have trouble with your vision.  Get help right away if:  · You develop a severe headache or confusion.  · You have unusual weakness, numbness, or feel faint.  · You have severe chest or abdominal pain.  · You vomit repeatedly.  · You have trouble breathing.  This information is not intended to replace advice given to you by your health care provider. Make sure you discuss any questions you have with your health care provider.  Document Released: 12/18/2006 Document Revised: 05/25/2017 Document Reviewed: 10/10/2014  [employer] Interactive Patient Education © 2017 [employer]

## 2019-02-06 ENCOUNTER — OFFICE VISIT (OUTPATIENT)
Dept: INTERNAL MEDICINE | Facility: MEDICAL CENTER | Age: 39
End: 2019-02-06
Payer: COMMERCIAL

## 2019-02-06 VITALS
DIASTOLIC BLOOD PRESSURE: 88 MMHG | TEMPERATURE: 98.7 F | RESPIRATION RATE: 17 BRPM | SYSTOLIC BLOOD PRESSURE: 126 MMHG | HEIGHT: 66 IN | OXYGEN SATURATION: 97 % | HEART RATE: 98 BPM | BODY MASS INDEX: 35.26 KG/M2 | WEIGHT: 219.4 LBS

## 2019-02-06 DIAGNOSIS — I10 ESSENTIAL HYPERTENSION: ICD-10-CM

## 2019-02-06 PROCEDURE — 99213 OFFICE O/P EST LOW 20 MIN: CPT | Mod: GE | Performed by: INTERNAL MEDICINE

## 2019-02-06 ASSESSMENT — ENCOUNTER SYMPTOMS
WEAKNESS: 0
DIZZINESS: 0
POLYDIPSIA: 0
SHORTNESS OF BREATH: 0
FEVER: 0
PALPITATIONS: 0
DEPRESSION: 0
HEARTBURN: 0
HEADACHES: 1
NERVOUS/ANXIOUS: 0
BLURRED VISION: 0
SENSORY CHANGE: 0
CHILLS: 0
COUGH: 0
MYALGIAS: 0
MEMORY LOSS: 0
ABDOMINAL PAIN: 0
FOCAL WEAKNESS: 0
NAUSEA: 0
INSOMNIA: 0

## 2019-02-06 ASSESSMENT — PATIENT HEALTH QUESTIONNAIRE - PHQ9: CLINICAL INTERPRETATION OF PHQ2 SCORE: 0

## 2019-02-06 ASSESSMENT — PAIN SCALES - GENERAL: PAINLEVEL: 6=MODERATE PAIN

## 2019-02-06 NOTE — PROGRESS NOTES
Established Patient    Татьяна COON presents today with the following:    CC: Blood pressure management    HPI: Patient is a pleasant 38-year-old lady who comes in for her blood pressure management.  Recently, during her last visit she was told to slowly wean off of her metoprolol, but to continue her hydrochlorothiazide 12.5 mg daily.  She is now off of her metoprolol, and her BP ranges between 110s-120s/86-89 with one reading DBP 96.  She does endorse increased tension headaches since she stopped the metoprolol.        Patient Active Problem List    Diagnosis Date Noted   • Essential hypertension 01/23/2019   • Obesity (BMI 35.0-39.9 without comorbidity) ([facility]) 12/24/2017       Current Outpatient Prescriptions   Medication Sig Dispense Refill   • PAZEO 0.7 % Solution INSTILL 1 DROP INTO BOTH EYES EVERY MORNING AS DIRECTED  6   • hydroCHLOROthiazide (HYDRODIURIL) 25 MG Tab Take 0.5 Tabs by mouth every day. 30 Tab 1   • metoprolol (LOPRESSOR) 25 MG Tab Take 1 Tab by mouth 2 times a day. 15 Tab 0   • ibuprofen (MOTRIN) 800 MG Tab Take 1 Tab by mouth every 8 hours as needed. 30 Tab 3     No current facility-administered medications for this visit.        ROS: As per HPI. Additional pertinent symptoms as noted below.    Review of Systems   Constitutional: Negative for chills, fever and malaise/fatigue.   HENT: Negative for hearing loss.    Eyes: Negative for blurred vision.   Respiratory: Negative for cough and shortness of breath.    Cardiovascular: Negative for chest pain, palpitations and leg swelling.   Gastrointestinal: Negative for abdominal pain, heartburn and nausea.   Genitourinary: Negative for dysuria and frequency.   Musculoskeletal: Negative for joint pain and myalgias.   Skin: Negative for rash.   Neurological: Positive for headaches. Negative for dizziness, sensory change, focal weakness and weakness.   Endo/Heme/Allergies: Negative for polydipsia.   Psychiatric/Behavioral: Negative for depression and memory  "loss. The patient is not nervous/anxious and does not have insomnia.      /88   Pulse 98   Temp 37.1 °C (98.7 °F) (Temporal)   Resp 17   Ht 1.676 m (5' 6\")   Wt 99.5 kg (219 lb 6.4 oz)   LMP 01/25/2019 (Approximate)   SpO2 97%   Breastfeeding? No   BMI 35.41 kg/m²     Physical Exam   Constitutional:  oriented to person, place, and time. No distress.  Obese, well-groomed, healthy-appearing.  Eyes: Pupils are equal, round, and reactive to light. No scleral icterus.  Neck: Neck supple. No visible goiter.   Cardiovascular: Normal rate, regular rhythm and normal heart sounds.  Exam reveals no gallop and no friction rub.  No murmur heard.  No lower extremity edema.  Pulmonary/Chest: Breath sounds normal. Chest wall is not dull to percussion.   Musculoskeletal:   no edema.   Lymphadenopathy: no cervical adenopathy  Neurological: alert and oriented to person, place, and time.  Grossly nonfocal.  Ambulatory.  Skin: No cyanosis. Nails show no clubbing.  Psych: Normal mood and affect.      Assessment and Plan    Hypertension  SBP 110s-120s at home, DBP 86-89 (96 one-time only)  Increased headaches since stopping metoprolol - ?Rebound  Continue hydrochlorothiazide 12.5 mg daily  Advised patient to keep BP log at home and return to clinic with PCP in a few weeks.  PCP to consider starting lisinopril if needed  Educated patient on continued low-salt diet, and exercise.    -CBC,CMP, lipid, TSH not done - patient states she will be doing them soon, before next visit    Signed by: Penny Desai M.D.    "

## 2019-02-07 NOTE — PATIENT INSTRUCTIONS
Hypertension  Hypertension, commonly called high blood pressure, is when the force of blood pumping through your arteries is too strong. Your arteries are the blood vessels that carry blood from your heart throughout your body. A blood pressure reading consists of a higher number over a lower number, such as 110/72. The higher number (systolic) is the pressure inside your arteries when your heart pumps. The lower number (diastolic) is the pressure inside your arteries when your heart relaxes. Ideally you want your blood pressure below 120/80.  Hypertension forces your heart to work harder to pump blood. Your arteries may become narrow or stiff. Having untreated or uncontrolled hypertension can cause heart attack, stroke, kidney disease, and other problems.  What increases the risk?  Some risk factors for high blood pressure are controllable. Others are not.  Risk factors you cannot control include:  · Race. You may be at higher risk if you are .  · Age. Risk increases with age.  · Gender. Men are at higher risk than women before age 45 years. After age 65, women are at higher risk than men.  Risk factors you can control include:  · Not getting enough exercise or physical activity.  · Being overweight.  · Getting too much fat, sugar, calories, or salt in your diet.  · Drinking too much alcohol.  What are the signs or symptoms?  Hypertension does not usually cause signs or symptoms. Extremely high blood pressure (hypertensive crisis) may cause headache, anxiety, shortness of breath, and nosebleed.  How is this diagnosed?  To check if you have hypertension, your health care provider will measure your blood pressure while you are seated, with your arm held at the level of your heart. It should be measured at least twice using the same arm. Certain conditions can cause a difference in blood pressure between your right and left arms. A blood pressure reading that is higher than normal on one occasion does  not mean that you need treatment. If it is not clear whether you have high blood pressure, you may be asked to return on a different day to have your blood pressure checked again. Or, you may be asked to monitor your blood pressure at home for 1 or more weeks.  How is this treated?  Treating high blood pressure includes making lifestyle changes and possibly taking medicine. Living a healthy lifestyle can help lower high blood pressure. You may need to change some of your habits.  Lifestyle changes may include:  · Following the DASH diet. This diet is high in fruits, vegetables, and whole grains. It is low in salt, red meat, and added sugars.  · Keep your sodium intake below 2,300 mg per day.  · Getting at least 30-45 minutes of aerobic exercise at least 4 times per week.  · Losing weight if necessary.  · Not smoking.  · Limiting alcoholic beverages.  · Learning ways to reduce stress.  Your health care provider may prescribe medicine if lifestyle changes are not enough to get your blood pressure under control, and if one of the following is true:  · You are 18-59 years of age and your systolic blood pressure is above 140.  · You are 60 years of age or older, and your systolic blood pressure is above 150.  · Your diastolic blood pressure is above 90.  · You have diabetes, and your systolic blood pressure is over 140 or your diastolic blood pressure is over 90.  · You have kidney disease and your blood pressure is above 140/90.  · You have heart disease and your blood pressure is above 140/90.  Your personal target blood pressure may vary depending on your medical conditions, your age, and other factors.  Follow these instructions at home:  · Have your blood pressure rechecked as directed by your health care provider.  · Take medicines only as directed by your health care provider. Follow the directions carefully. Blood pressure medicines must be taken as prescribed. The medicine does not work as well when you skip  doses. Skipping doses also puts you at risk for problems.  · Do not smoke.  · Monitor your blood pressure at home as directed by your health care provider.  Contact a health care provider if:  · You think you are having a reaction to medicines taken.  · You have recurrent headaches or feel dizzy.  · You have swelling in your ankles.  · You have trouble with your vision.  Get help right away if:  · You develop a severe headache or confusion.  · You have unusual weakness, numbness, or feel faint.  · You have severe chest or abdominal pain.  · You vomit repeatedly.  · You have trouble breathing.  This information is not intended to replace advice given to you by your health care provider. Make sure you discuss any questions you have with your health care provider.  Document Released: 12/18/2006 Document Revised: 05/25/2017 Document Reviewed: 10/10/2014  [employer] Interactive Patient Education © 2017 [employer]

## 2019-03-07 ENCOUNTER — HOSPITAL ENCOUNTER (OUTPATIENT)
Dept: LAB | Facility: MEDICAL CENTER | Age: 39
End: 2019-03-07
Attending: STUDENT IN AN ORGANIZED HEALTH CARE EDUCATION/TRAINING PROGRAM
Payer: COMMERCIAL

## 2019-03-07 DIAGNOSIS — Z00.00 HEALTH CARE MAINTENANCE: ICD-10-CM

## 2019-03-07 LAB
ALBUMIN SERPL BCP-MCNC: 4.3 G/DL (ref 3.2–4.9)
ALBUMIN/GLOB SERPL: 1.6 G/DL
ALP SERPL-CCNC: 82 U/L (ref 30–99)
ALT SERPL-CCNC: 13 U/L (ref 2–50)
ANION GAP SERPL CALC-SCNC: 8 MMOL/L (ref 0–11.9)
AST SERPL-CCNC: 16 U/L (ref 12–45)
BASOPHILS # BLD AUTO: 0.6 % (ref 0–1.8)
BASOPHILS # BLD: 0.04 K/UL (ref 0–0.12)
BILIRUB SERPL-MCNC: 0.6 MG/DL (ref 0.1–1.5)
BUN SERPL-MCNC: 17 MG/DL (ref 8–22)
CALCIUM SERPL-MCNC: 9.5 MG/DL (ref 8.5–10.5)
CHLORIDE SERPL-SCNC: 105 MMOL/L (ref 96–112)
CO2 SERPL-SCNC: 25 MMOL/L (ref 20–33)
CREAT SERPL-MCNC: 0.73 MG/DL (ref 0.5–1.4)
EOSINOPHIL # BLD AUTO: 0.21 K/UL (ref 0–0.51)
EOSINOPHIL NFR BLD: 3.2 % (ref 0–6.9)
ERYTHROCYTE [DISTWIDTH] IN BLOOD BY AUTOMATED COUNT: 44.4 FL (ref 35.9–50)
EST. AVERAGE GLUCOSE BLD GHB EST-MCNC: 114 MG/DL
FASTING STATUS PATIENT QL REPORTED: NORMAL
GLOBULIN SER CALC-MCNC: 2.7 G/DL (ref 1.9–3.5)
GLUCOSE SERPL-MCNC: 89 MG/DL (ref 65–99)
HBA1C MFR BLD: 5.6 % (ref 0–5.6)
HBV SURFACE AB SERPL IA-ACNC: <3.1 MIU/ML (ref 0–10)
HCT VFR BLD AUTO: 47.8 % (ref 37–47)
HGB BLD-MCNC: 15.3 G/DL (ref 12–16)
IMM GRANULOCYTES # BLD AUTO: 0.01 K/UL (ref 0–0.11)
IMM GRANULOCYTES NFR BLD AUTO: 0.2 % (ref 0–0.9)
LYMPHOCYTES # BLD AUTO: 2.39 K/UL (ref 1–4.8)
LYMPHOCYTES NFR BLD: 36 % (ref 22–41)
MCH RBC QN AUTO: 28.6 PG (ref 27–33)
MCHC RBC AUTO-ENTMCNC: 32 G/DL (ref 33.6–35)
MCV RBC AUTO: 89.3 FL (ref 81.4–97.8)
MONOCYTES # BLD AUTO: 0.53 K/UL (ref 0–0.85)
MONOCYTES NFR BLD AUTO: 8 % (ref 0–13.4)
NEUTROPHILS # BLD AUTO: 3.45 K/UL (ref 2–7.15)
NEUTROPHILS NFR BLD: 52 % (ref 44–72)
NRBC # BLD AUTO: 0 K/UL
NRBC BLD-RTO: 0 /100 WBC
PLATELET # BLD AUTO: 305 K/UL (ref 164–446)
PMV BLD AUTO: 10.3 FL (ref 9–12.9)
POTASSIUM SERPL-SCNC: 3.9 MMOL/L (ref 3.6–5.5)
PROT SERPL-MCNC: 7 G/DL (ref 6–8.2)
RBC # BLD AUTO: 5.35 M/UL (ref 4.2–5.4)
SODIUM SERPL-SCNC: 138 MMOL/L (ref 135–145)
TSH SERPL DL<=0.005 MIU/L-ACNC: 0.64 UIU/ML (ref 0.38–5.33)
WBC # BLD AUTO: 6.6 K/UL (ref 4.8–10.8)

## 2019-03-07 PROCEDURE — 80053 COMPREHEN METABOLIC PANEL: CPT

## 2019-03-07 PROCEDURE — 85025 COMPLETE CBC W/AUTO DIFF WBC: CPT

## 2019-03-07 PROCEDURE — 86706 HEP B SURFACE ANTIBODY: CPT

## 2019-03-07 PROCEDURE — 84443 ASSAY THYROID STIM HORMONE: CPT

## 2019-03-07 PROCEDURE — 36415 COLL VENOUS BLD VENIPUNCTURE: CPT

## 2019-03-07 PROCEDURE — 83036 HEMOGLOBIN GLYCOSYLATED A1C: CPT

## 2019-03-07 PROCEDURE — 86708 HEPATITIS A ANTIBODY: CPT

## 2019-03-07 PROCEDURE — 86709 HEPATITIS A IGM ANTIBODY: CPT

## 2019-03-09 LAB
HAV AB SER QL IA: NEGATIVE
HAV IGM SERPL QL IA: NEGATIVE

## 2019-03-19 ENCOUNTER — OFFICE VISIT (OUTPATIENT)
Dept: MEDICAL GROUP | Facility: PHYSICIAN GROUP | Age: 39
End: 2019-03-19
Payer: COMMERCIAL

## 2019-03-19 VITALS
TEMPERATURE: 97.8 F | HEART RATE: 95 BPM | RESPIRATION RATE: 14 BRPM | BODY MASS INDEX: 36.99 KG/M2 | SYSTOLIC BLOOD PRESSURE: 118 MMHG | DIASTOLIC BLOOD PRESSURE: 84 MMHG | OXYGEN SATURATION: 98 % | WEIGHT: 222 LBS | HEIGHT: 65 IN

## 2019-03-19 DIAGNOSIS — F32.A MILD DEPRESSION: ICD-10-CM

## 2019-03-19 DIAGNOSIS — R00.0 TACHYCARDIA: ICD-10-CM

## 2019-03-19 DIAGNOSIS — D22.9 NUMEROUS MOLES: ICD-10-CM

## 2019-03-19 DIAGNOSIS — E53.8 VITAMIN B12 DEFICIENCY: ICD-10-CM

## 2019-03-19 DIAGNOSIS — E55.9 VITAMIN D DEFICIENCY: ICD-10-CM

## 2019-03-19 DIAGNOSIS — E78.5 DYSLIPIDEMIA: ICD-10-CM

## 2019-03-19 DIAGNOSIS — I10 ESSENTIAL HYPERTENSION: ICD-10-CM

## 2019-03-19 PROBLEM — E66.9 OBESITY (BMI 35.0-39.9 WITHOUT COMORBIDITY): Status: RESOLVED | Noted: 2017-12-24 | Resolved: 2019-03-19

## 2019-03-19 PROCEDURE — 93000 ELECTROCARDIOGRAM COMPLETE: CPT | Performed by: FAMILY MEDICINE

## 2019-03-19 PROCEDURE — 99215 OFFICE O/P EST HI 40 MIN: CPT | Performed by: FAMILY MEDICINE

## 2019-03-19 RX ORDER — HYDROCHLOROTHIAZIDE 25 MG/1
25 TABLET ORAL DAILY
Qty: 30 TAB | Refills: 1 | Status: SHIPPED | OUTPATIENT
Start: 2019-03-19 | End: 2019-04-14 | Stop reason: SDUPTHER

## 2019-03-20 NOTE — PATIENT INSTRUCTIONS
Diagnoses and all orders for this visit:    BMI 36.0-36.9,adult    Essential hypertension    Numerous moles  -     REFERRAL TO DERMATOLOGY    Mild depression (HCC)  -     REFERRAL TO BEHAVIORAL HEALTH    Tachycardia  -     EKG - Clinic Performed    Vitamin D deficiency  -     VITAMIN D,25 HYDROXY; Future    Vitamin B12 deficiency  -     VITAMIN B12; Future    Dyslipidemia    Blood pressure was initially 140s over 90s but on recheck did come down.  She is currently taking hydrochlorothiazide 12.5 mg daily.  Her blood pressure recordings at home have been in the 130s over 90 sometimes and heart rates in the 80s-90s sometimes.  6 weeks ago she was weaned off of metoprolol 25 mg.  Referral will be given to dermatology for annual skin cancer screening due to her numerous moles and history of removal of melanoma on her back.  For mild depression will also refer her for behavioral health counseling and she could also go on psychology today and see the psychologist and counselors in her area to self refer.  We will also check her vitamin B12 and vitamin D.  EKG was done today which showed normal sinus rhythm and heart rate in the 80s and borderline QT elevated and borderline T waves.  We will recheck this EKG at her next visit to compare as she is not currently having any chest concerns.  She was changed on her metoprolol and did initially get headaches which have now resolved.  Desired goal for blood pressure is 120s over 80s.  We will have her check her blood pressure top number and bottom number and heart rate in both arms periodically in the morning, afternoon, and evening and keep a log of this on the forms and the patient instructions that have been given to her and she could check this every second day or once a day and indicate when in the day she is doing these logs and fill out this paperwork and bring it at her next visit.  She will also work on her weight and dietitian referral offered but she is going to work  on her weight diet and exercise first and we will revisit at the next visit and see how she does.  In 6 months we will recheck her cholesterol, thyroid and A1c.  Her LDL was mildly elevated but no need for cholesterol medication at this point but will reconsider and see how her cholesterol is going.  She is never seen a cardiologist before and we will repeat her EKG and increase her hydrochlorothiazide to a full pill of 25 mg daily and see how she does with this and not started on metoprolol right now.  ER precautions given if any chest pain, shortness of breath, dizziness, passing out then please call 911 or go to the ER. She is also due for her hepatitis a and B vaccine as we do not have a combo vaccine here, she will go to the health department or call the pharmacy and get the combination vaccine there.      Return in about 5 weeks (around 4/25/2019), or HTN/Referrals/Dep, for Long, 40 min appnt.    How to Take Your Blood Pressure  Blood pressure is a measurement of how strongly your blood is pressing against the walls of your arteries. Arteries are blood vessels that carry blood from your heart throughout your body. Your health care provider takes your blood pressure at each office visit. You can also take your own blood pressure at home with a blood pressure machine. You may need to take your own blood pressure:  · To confirm a diagnosis of high blood pressure (hypertension).  · To monitor your blood pressure over time.  · To make sure your blood pressure medicine is working.  Supplies needed:  To take your blood pressure, you will need a blood pressure machine. You can buy a blood pressure machine, or blood pressure monitor, at most drugstores or online. There are several types of home blood pressure monitors. When choosing one, consider the following:  · Choose a monitor that has an arm cuff.  · Choose a monitor that wraps snugly around your upper arm. You should be able to fit only one finger between your  "arm and the cuff.  · Do not choose a monitor that measures your blood pressure from your wrist or finger.  Your health care provider can suggest a reliable monitor that will meet your needs.  How to prepare  To get the most accurate reading, avoid the following for 30 minutes before you check your blood pressure:  · Drinking caffeine.  · Drinking alcohol.  · Eating.  · Smoking.  · Exercising.  Five minutes before you check your blood pressure:  · Empty your bladder.  · Sit quietly without talking in a dining chair, rather than in a soft couch or armchair.  How to take your blood pressure  To check your blood pressure, follow the instructions in the manual that came with your blood pressure monitor. If you have a digital blood pressure monitor, the instructions may be as follows:  1. Sit up straight.  2. Place your feet on the floor. Do not cross your ankles or legs.  3. Rest your left arm at the level of your heart on a table or desk or on the arm of a chair.  4. Pull up your shirt sleeve.  5. Wrap the blood pressure cuff around the upper part of your left arm, 1 inch (2.5 cm) above your elbow. It is best to wrap the cuff around bare skin.  6. Fit the cuff snugly around your arm. You should be able to place only one finger between the cuff and your arm.  7. Position the cord inside the groove of your elbow.  8. Press the power button.  9. Sit quietly while the cuff inflates and deflates.  10. Read the digital reading on the monitor screen and write it down (record it).  11. Wait 2-3 minutes, then repeat the steps starting at step 1.  What does my blood pressure reading mean?  A blood pressure reading is recorded as two numbers, such as \"120 over 80\" (or 120/80). The first (\"top\") number is called the systolic pressure. It is a measure of the pressure in your arteries as the heart beats. The second (\"bottom\") number is called the diastolic pressure. It is a measure of the pressure in your arteries as the heart " relaxes between beats.  Blood pressure is classified into four stages. The following are the stages for adults who do not have a short-term serious illness or a chronic condition. Systolic pressure and diastolic pressure are measured in a unit called mm Hg.  Normal  · Systolic pressure: below 120.  · Diastolic pressure: below 80.  Prehypertension  · Systolic pressure: 120-139.  · Diastolic pressure: 80-89.  Hypertension stage 1  · Systolic pressure: 140-159.  · Diastolic pressure: 90-99.  Hypertension stage 2  · Systolic pressure: 160 or above.  · Diastolic pressure: 100 or above.  You can have prehypertension or hypertension even if only the systolic or only the diastolic number in your reading is higher than normal.  Follow these instructions at home:  · Check your blood pressure as often as recommended by your health care provider.  · Take your monitor to the next appointment with your health care provider to make sure:  ¨ That you are using it correctly.  ¨ That it provides accurate readings.  · Be sure you understand what your goal blood pressure numbers are.  · Tell your health care provider if you are having any side effects from blood pressure medicine.  Contact a health care provider if:  · Your blood pressure is consistently high.  Get help right away if:  · Your systolic blood pressure is higher than 180.  · Your diastolic blood pressure is higher than 110.  This information is not intended to replace advice given to you by your health care provider. Make sure you discuss any questions you have with your health care provider.    Introduction  Your blood pressure on this visit to the emergency department or clinic is elevated. This does not necessarily mean you have high blood pressure (hypertension), but it does mean that your blood pressure needs to be rechecked. Many times your blood pressure can increase due to illness, pain, anxiety, or other factors.  We recommend that you get a series of blood  pressure readings done over a period of 5 days. It is best to get a reading in the morning and one in the evening. You should make sure to sit and relax for 1-5 minutes before the reading is taken. Write the readings down and make a follow-up appointment with your health care provider to discuss the results. If there is not a free clinic or a drug store with a blood-pressure-taking machine near you, you can purchase blood-pressure-taking equipment from a drug store. Having one in the home allows you the convenience of taking your blood pressure while you are home and relaxed.  BLOOD PRESSURE LOG   Date: _______________________  · a.m. _____________________  · p.m. _____________________  Date: _______________________  · a.m. _____________________  · p.m. _____________________  Date: _______________________  · a.m. _____________________  · p.m. _____________________  Date: _______________________  · a.m. _____________________  · p.m. _____________________  Date: _______________________  · a.m. _____________________  · p.m. _____________________  This information is not intended to replace advice given to you by your health care provider. Make sure you discuss any questions you have with your health care provider.

## 2019-03-20 NOTE — PROGRESS NOTES
cc:  Establish care, hypertension, BMI 36.     Subjective:     Татьяна Velez is a 38 y.o. female presenting  Establish care, hypertension, BMI 36.  She reports she has had hypertension for about 10 years.  For 10 years she has been on hydrochlorthiazide 12.5 mg.  She was also on metoprolol 25 mg once a day for the same amount of time but was recently 6 weeks ago weaned off of this when she saw 2 separate doctors as she did not have a primary care and was seen at [facility].  Lives with her . No kids. Works full time as  and clothing business. No social concerns.  Reports she did have melanoma removed as a child and has not seen the dermatologist recently.  Does have numerous moles on her body which she does check and does not see any major changes at this point but would like to see a dermatologist for her annual screening for skin.  Does report having some mild depression as she takes care at home and at work right now.  Denies any anxiety.  Denies any panic attacks or any thoughts to hurt herself or others.  She reports she still able to function really well at work and at home and takes interest in her numerous ambitions and her clothing but she does think she could benefit by possibly seeing a counselor and is also checking through [facility] to see if they have counseling there.  She did recently have some lab work done at [facility].  Complete blood count was within normal limits, complete metabolic panel and kidney and liver was within normal limits, A1c diabetic screen was 5.6, thyroid TSH was within normal limits but on the lower end of normal.  She did have an ultrasound done with her thyroid as a teenager which was normal.  She did have her cholesterol last checked October 2018 which did show normal total cholesterol but a good cholesterol HDL that was low at 36 and LDL bad cholesterol high at 121.  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  Kidney and GFR was within normal limits.  Since she has been off her metoprolol her blood pressure has been 130s over 90 sometimes and heart rates 80 to 90s.  Review of systems:     Constitutional: Negative for fever, chills and negative fatigue.   HENT: Negative for sinus pressure, negative for ear pain or hearing loss  Eyes: Negative for blurriness, negative for double vision  Respiratory: Negative for cough and shortness of breath, negative for exertional shortness of breath  Cardiovascular: Negative for leg swelling, negative for palpitations, negative for chest pain  Gastrointestinal: Negative for nausea, vomiting, abdominal pain, constipation and diarrhea.  Genitourinary: Negative for dysuria and hematuria.   Skin: Negative for rash.  Numerous skin moles all over her body.  Neurological: Negative for dizziness, focal weakness and headaches.   Endo/Heme/Allergies: Denies bleeding, bruising, and recurrent allergies.  Psychiatric/Behavioral: Negative for  anxiety.  Positive for mild depression.  Denies any HI or SI.        Current Outpatient Prescriptions:   •  hydroCHLOROthiazide (HYDRODIURIL) 25 MG Tab, Take 0.5 Tabs by mouth every day., Disp: 30 Tab, Rfl: 1  •  PAZEO 0.7 % Solution, INSTILL 1 DROP INTO BOTH EYES EVERY MORNING AS DIRECTED, Disp: , Rfl: 6    Allergies, past medical history, past surgical history, family history, social history reviewed and updated    Objective:     Vitals: /84   Pulse 95   Temp 36.6 °C (97.8 °F) (Temporal)   Resp 14   Ht 1.651 m (5' 5\")   Wt 100.7 kg (222 lb)   LMP 03/09/2019 (Exact Date)   SpO2 98%   Breastfeeding? No   BMI 36.94 kg/m²   General: Alert, pleasant, NAD  HEENT: Normocephalic.  Nontraumatic. EOMI, no icterus or pallor.  Conjunctivae and lids normal. External ears normal. Oropharynx non-erythematous, mucous membranes moist.  Neck supple.  No thyromegaly or masses palpated. No cervical or supraclavicular " lymphadenopathy.  Heart: Regular rate and rhythm.  S1 and S2 normal.  No murmurs appreciated.  Respiratory: Normal respiratory effort.  Clear to auscultation bilaterally.  Abdomen: Non-distended, soft, non tender in all 4 quadrants.  Skin: Warm, dry, no rashes.  Numerous skin moles all over her body.  Musculoskeletal: Gait is normal.  Moves all extremities well.  Extremities: No leg edema.  Pedal pulses 2+ symmetric.   Psych:  Affect/mood is normal, judgement is good, memory is intact, grooming is appropriate.    Assessment/Plan:     Diagnoses and all orders for this visit:    BMI 36.0-36.9,adult    Essential hypertension    Numerous moles  -     REFERRAL TO DERMATOLOGY    Mild depression (HCC)  -     REFERRAL TO BEHAVIORAL HEALTH    Tachycardia  -     EKG - Clinic Performed    Vitamin D deficiency  -     VITAMIN D,25 HYDROXY; Future    Vitamin B12 deficiency  -     VITAMIN B12; Future    Dyslipidemia    Blood pressure was initially 140s over 90s but on recheck did come down.  She is currently taking hydrochlorothiazide 12.5 mg daily.  Her blood pressure recordings at home have been in the 130s over 90 sometimes and heart rates in the 80s-90s sometimes.  6 weeks ago she was weaned off of metoprolol 25 mg.  Referral will be given to dermatology for annual skin cancer screening due to her numerous moles and history of removal of melanoma on her back.  For mild depression will also refer her for behavioral health counseling and she could also go on psychology today and see the psychologist and counselors in her area to self refer.  We will also check her vitamin B12 and vitamin D.  EKG was done today which showed normal sinus rhythm and heart rate in the 80s and borderline QT elevated and borderline T waves.  We will recheck this EKG at her next visit to compare as she is not currently having any chest concerns.  She was changed on her metoprolol and did initially get headaches which have now resolved.  Desired goal  for blood pressure is 120s over 80s.  We will have her check her blood pressure top number and bottom number and heart rate in both arms periodically in the morning, afternoon, and evening and keep a log of this on the forms and the patient instructions that have been given to her and she could check this every second day or once a day and indicate when in the day she is doing these logs and fill out this paperwork and bring it at her next visit.  She will also work on her weight and dietitian referral offered but she is going to work on her weight diet and exercise first and we will revisit at the next visit and see how she does.  In 6 months we will recheck her cholesterol, thyroid and A1c.  Her LDL was mildly elevated but no need for cholesterol medication at this point but will reconsider and see how her cholesterol is going.  She is never seen a cardiologist before and we will repeat her EKG and increase her hydrochlorothiazide to a full pill of 25 mg daily and see how she does with this and not started on metoprolol right now.  ER precautions given if any chest pain, shortness of breath, dizziness, passing out then please call 911 or go to the ER.  She is also due for her hepatitis a and B vaccine as we do not have a combo vaccine here, she will go to the health department or call the pharmacy and get the combination vaccine there.    Return in about 5 weeks (around 4/25/2019), or HTN/Referrals/Dep, for Long, 40 min appnt.      Patient was seen for 70 minutes face to face of which, 40 minutes was spent counseling regarding above plan.

## 2019-04-14 DIAGNOSIS — I10 ESSENTIAL HYPERTENSION: ICD-10-CM

## 2019-04-15 RX ORDER — HYDROCHLOROTHIAZIDE 25 MG/1
TABLET ORAL
Qty: 90 TAB | Refills: 1 | Status: SHIPPED | OUTPATIENT
Start: 2019-04-15 | End: 2019-07-31 | Stop reason: SDUPTHER

## 2019-04-15 NOTE — TELEPHONE ENCOUNTER
Refill X 6 months, sent to pharmacy.Pt. Seen in the last 6 months per protocol.   Lab Results   Component Value Date/Time    SODIUM 138 03/07/2019 07:32 AM    POTASSIUM 3.9 03/07/2019 07:32 AM    CHLORIDE 105 03/07/2019 07:32 AM    CO2 25 03/07/2019 07:32 AM    GLUCOSE 89 03/07/2019 07:32 AM    BUN 17 03/07/2019 07:32 AM    CREATININE 0.73 03/07/2019 07:32 AM

## 2019-04-15 NOTE — TELEPHONE ENCOUNTER
Was the patient seen in the last year in this department? Yes    Does patient have an active prescription for medications requested? No     Received Request Via: Pharmacy      Pt met protocol?: Yes   Pt last ov 3/19   BP Readings from Last 1 Encounters:   03/19/19 118/84

## 2019-04-19 ENCOUNTER — TELEPHONE (OUTPATIENT)
Dept: INTERNAL MEDICINE | Facility: MEDICAL CENTER | Age: 39
End: 2019-04-19

## 2019-04-19 NOTE — TELEPHONE ENCOUNTER
Pt informed       Message   Received: 3 days ago   Message Contents   ANJANA Higuera, Med Ass't             She has good immunity/antibodies level against Hepatitis B virus.        Previous Messages      ----- Message -----   From: Samia Franklin, Med Ass't   Sent: 4/16/2019   9:16 AM   To: Faizan Velazquez M.D.     Please interpret results so I can notify pt and I can document in encounter that pt has been notified.     Thanks!   ----- Message -----   From: Faizan Velazquez M.D.   Sent: 4/16/2019   1:56 AM   To: Samia Franklin, Med Ass't     I got this message from [place] to inform patient on result per Lab audit- Please notify pt of her Hep B lab results from 3/7/19.   ----- Message -----   From: Samia Franklin, Med Ass't   Sent: 4/15/2019   8:20 AM   To: Faizan Velazquez M.D.     Message you sent is blank. Is there something you wanted me to inform the pt about?     ----- Message -----   From: Faizan Velazquez M.D.   Sent: 4/8/2019   7:28 AM   To: Samia Franklin Med Ass't         ----- Message -----   From: [place] Phillip Med Ass't   Sent: 4/5/2019   3:48 PM   To: Faizan Velazquez M.D.     Lab audit- Please notify pt of her Hep B lab results from 3/7/19.

## 2019-04-30 ENCOUNTER — OFFICE VISIT (OUTPATIENT)
Dept: MEDICAL GROUP | Facility: PHYSICIAN GROUP | Age: 39
End: 2019-04-30
Payer: COMMERCIAL

## 2019-04-30 VITALS
BODY MASS INDEX: 36.1 KG/M2 | RESPIRATION RATE: 14 BRPM | HEIGHT: 66 IN | HEART RATE: 86 BPM | WEIGHT: 224.6 LBS | DIASTOLIC BLOOD PRESSURE: 86 MMHG | SYSTOLIC BLOOD PRESSURE: 130 MMHG | OXYGEN SATURATION: 97 % | TEMPERATURE: 97.8 F

## 2019-04-30 DIAGNOSIS — Z23 NEED FOR VACCINATION: ICD-10-CM

## 2019-04-30 DIAGNOSIS — M54.2 NECK PAIN: ICD-10-CM

## 2019-04-30 DIAGNOSIS — R53.83 FATIGUE, UNSPECIFIED TYPE: ICD-10-CM

## 2019-04-30 DIAGNOSIS — G47.30 SLEEP APNEA, UNSPECIFIED TYPE: ICD-10-CM

## 2019-04-30 DIAGNOSIS — K21.9 GASTROESOPHAGEAL REFLUX DISEASE WITHOUT ESOPHAGITIS: ICD-10-CM

## 2019-04-30 DIAGNOSIS — J30.1 SEASONAL ALLERGIC RHINITIS DUE TO POLLEN: ICD-10-CM

## 2019-04-30 DIAGNOSIS — G44.229 CHRONIC TENSION-TYPE HEADACHE, NOT INTRACTABLE: ICD-10-CM

## 2019-04-30 DIAGNOSIS — E04.9 GOITER: ICD-10-CM

## 2019-04-30 PROCEDURE — 90471 IMMUNIZATION ADMIN: CPT | Performed by: FAMILY MEDICINE

## 2019-04-30 PROCEDURE — 90715 TDAP VACCINE 7 YRS/> IM: CPT | Performed by: FAMILY MEDICINE

## 2019-04-30 PROCEDURE — 99214 OFFICE O/P EST MOD 30 MIN: CPT | Mod: 25 | Performed by: FAMILY MEDICINE

## 2019-04-30 RX ORDER — OMEPRAZOLE 40 MG/1
40 CAPSULE, DELAYED RELEASE ORAL DAILY
Qty: 30 CAP | Refills: 1 | Status: SHIPPED | OUTPATIENT
Start: 2019-04-30 | End: 2019-05-23 | Stop reason: SDUPTHER

## 2019-04-30 RX ORDER — CYCLOBENZAPRINE HCL 5 MG
5-10 TABLET ORAL 3 TIMES DAILY PRN
Qty: 60 TAB | Refills: 1 | Status: SHIPPED | OUTPATIENT
Start: 2019-04-30 | End: 2019-10-08

## 2019-04-30 RX ORDER — FLUTICASONE PROPIONATE 50 MCG
1 SPRAY, SUSPENSION (ML) NASAL DAILY
Qty: 16 G | Refills: 2 | Status: SHIPPED | OUTPATIENT
Start: 2019-04-30 | End: 2019-05-23 | Stop reason: SDUPTHER

## 2019-04-30 RX ORDER — MONTELUKAST SODIUM 10 MG/1
10 TABLET ORAL DAILY
Qty: 30 TAB | Refills: 3 | Status: SHIPPED | OUTPATIENT
Start: 2019-04-30 | End: 2019-07-31 | Stop reason: SDUPTHER

## 2019-04-30 NOTE — PROGRESS NOTES
cc: Tdap vaccine, headaches, neck swelling and neck pain, snoring, GERD, seasonal allergies    Subjective:     Татьяна Velez is a 39 y.o. female presenting we will get her lab work done before her well woman exam in June for her Pap test.  Her weight in the last month went from 222pounds to 224 pounds but she is currently on her menstrual cycle.  She will make her appointment for the dermatology.  She is currently taking the hydrochlorothiazide 25 mg.  She has been doing this increased dose of hydrochlorothiazide 25 mg from 12.5 mg for about 5 weeks.  Her lowest blood pressure has been around 118/75 and her highest 130s over 80s and average is 130s over 80s.  Her heart rates around 80s.  He has been off the metoprolol since February 2019.  She reports her depression is about the same and she still has to see a counselor but her  might be getting a counseling appointment with her and she is going to look into that to.  She denies any chest pain but does get sometimes heartburn.  Gets heartburn 2-3 times a week and takes one Zantac and this goes away with antacid.  She has been getting headaches since she seen me and it does eventually go away but has kind of got worse with her.  And seasonal nasal allergies.  She is due for Pap test and will do this later.  She reports her nasal congestion and allergies are bad and she is doing daily Pazeo eyedrops and Nasacort nasal spray and and also over-the-counter antihistamine such as Zyrtec.    Review of systems:     Constitutional: Negative for fever, chills and positive fatigue.   HENT: Negative for sinus pressure, negative for ear pain or hearing loss, positive nasal congestion and seasonal allergies  Eyes: Negative for blurriness, negative for double vision  Respiratory: Negative for cough and shortness of breath, negative for exertional shortness of breath, positive snoring  Cardiovascular: Negative for leg swelling, negative for palpitations, negative for chest  "pain  Gastrointestinal: Negative for nausea, vomiting, abdominal pain, constipation and diarrhea.  Genitourinary: Negative for dysuria and hematuria.   Skin: Negative for rash.   Neurological: Negative for dizziness, focal weakness and positive headaches.   Endo/Heme/Allergies: Denies bleeding, bruising, and recurrent allergies.  Psychiatric/Behavioral: Negative for depression and anxiety.        Current Outpatient Prescriptions:   •  montelukast (SINGULAIR) 10 MG Tab, Take 1 Tab by mouth every day., Disp: 30 Tab, Rfl: 3  •  omeprazole (PRILOSEC) 40 MG delayed-release capsule, Take 1 Cap by mouth every day., Disp: 30 Cap, Rfl: 1  •  cyclobenzaprine (FLEXERIL) 5 MG tablet, Take 1-2 Tabs by mouth 3 times a day as needed., Disp: 60 Tab, Rfl: 1  •  fluticasone (FLONASE) 50 MCG/ACT nasal spray, Spray 1 Spray in nose every day., Disp: 16 g, Rfl: 2  •  hydroCHLOROthiazide (HYDRODIURIL) 25 MG Tab, TAKE 1 TABLET BY MOUTH EVERY DAY, Disp: 90 Tab, Rfl: 1  •  PAZEO 0.7 % Solution, INSTILL 1 DROP INTO BOTH EYES EVERY MORNING AS DIRECTED, Disp: , Rfl: 6    Allergies, past medical history, past surgical history, family history, social history reviewed and updated    Objective:     Vitals: /86 (BP Location: Right arm, Patient Position: Sitting, BP Cuff Size: Adult)   Pulse 86   Temp 36.6 °C (97.8 °F) (Temporal)   Resp 14   Ht 1.676 m (5' 6\")   Wt 101.9 kg (224 lb 9.6 oz)   LMP 04/27/2019 (Exact Date)   SpO2 97%   Breastfeeding? No   BMI 36.25 kg/m²   General: Alert, pleasant, NAD  HEENT: Normocephalic.  Nontraumatic. EOMI, no icterus or pallor.  Conjunctivae and lids normal. External ears normal. Oropharynx non-erythematous, mucous membranes moist.  Neck supple.  Positive thyromegaly but no masses palpated. No cervical or supraclavicular lymphadenopathy.  Good flexion and extension at the neck and mild spasms on deep palpation of the muscles.  Heart: Regular rate and rhythm.  S1 and S2 normal.  No murmurs " appreciated.  Respiratory: Normal respiratory effort.  Clear to auscultation bilaterally.  Abdomen: Non-distended, soft, non tender in all 4 quadrants.  Skin: Warm, dry, no rashes.  Musculoskeletal: Gait is normal.  Moves all extremities well.  Extremities: No leg edema.  Pedal pulses 2+ symmetric.   Psych:  Affect/mood is normal, judgement is good, memory is intact, grooming is appropriate.    Assessment/Plan:     Diagnoses and all orders for this visit:    Seasonal allergic rhinitis due to pollen  -     montelukast (SINGULAIR) 10 MG Tab; Take 1 Tab by mouth every day.  -     fluticasone (FLONASE) 50 MCG/ACT nasal spray; Spray 1 Spray in nose every day.    Need for vaccination  -     Tdap Vaccine =>6YO IM    Chronic tension-type headache, not intractable  -     REFERRAL TO SLEEP STUDIES    BMI 36.0-36.9,adult  -     REFERRAL TO SLEEP STUDIES    Goiter  -     US-SOFT TISSUES OF HEAD - NECK; Future    Sleep apnea, unspecified type  -     REFERRAL TO SLEEP STUDIES    Fatigue, unspecified type  -     REFERRAL TO SLEEP STUDIES    Neck pain  -     US-SOFT TISSUES OF HEAD - NECK; Future  -     cyclobenzaprine (FLEXERIL) 5 MG tablet; Take 1-2 Tabs by mouth 3 times a day as needed.    Gastroesophageal reflux disease without esophagitis  -     omeprazole (PRILOSEC) 40 MG delayed-release capsule; Take 1 Cap by mouth every day.    -Referral sent for sleep study which she will call and schedule, call and schedule the dermatology referral, call and schedule behavioral health referral for counseling and can also go on psychology today and see the articles on there and self refer for people take her insurance or talk to her  if he will take her insurance and can also and I recommend to try headspace srikanth or Buddify before bedtime.  Headaches seem to be from neck tension and would recommend to do the neck exercises as indicated in the patient instruction form and to do over-the-counter icy  rubs or patches on her  neck before bed and massage and as needed to take 1 to 2 tablets of muscle relaxer before bed and ibuprofen 600 mg as needed during the day but not to take too much due to kidney effects and stomach ulcers and will start for acid reflux omeprazole 40 mg once a day daily on empty stomach in the morning for 30 days with 1 refill and if that helps then will reduce to 20 mg after.  Do yoga stretching and exercises and yoga breathing for helping to get oxygen to the brain to decrease headaches and drink enough water.  Will also do a neck ultrasound for her possible increased thyroid.  Tdap vaccine given.  Continue hydrochlorothiazide 25 mg and check blood pressure 1-2 times a week with blood pressure goal of 120s over 70s and check this heart rate and bring the paper of your blood pressure and heart rate at the next appointment sometimes in the morning, afternoon, evening.  For your allergies do daily over-the-counter saline rinses or [place] pot twice a day once in the morning and once at night to clear that area and then can do over-the-counter Flonase with steroids which is may be more helpful than Nasacort 1 to 2 sprays in each nostril in the morning and daily for prevention do Singulair montelukast 10 mg at night and then can do loratadine Claritin over-the-counter during the daytime.  We will see how her allergies and acid reflux and headaches do and get the labs and ultrasound done and follow-up at your well woman exam and depression is stable and no medication needed right now and will try with counseling first.  ER precautions given if any chest pain, shortness of breath, passing out, thoughts to hurt yourself or others then please go to ER or call 911.  Will work on weight loss at her next appointment    Return in about 6 weeks (around 6/11/2019), or labs/US/HA/GERD/allergies/Depression, for Long, 40 min appnt, Well Women Check with Pap.

## 2019-05-01 NOTE — PATIENT INSTRUCTIONS
Diagnoses and all orders for this visit:    Seasonal allergic rhinitis due to pollen  -     montelukast (SINGULAIR) 10 MG Tab; Take 1 Tab by mouth every day.  -     fluticasone (FLONASE) 50 MCG/ACT nasal spray; Spray 1 Spray in nose every day.    Need for vaccination  -     Tdap Vaccine =>8YO IM    Chronic tension-type headache, not intractable  -     REFERRAL TO SLEEP STUDIES    BMI 36.0-36.9,adult  -     REFERRAL TO SLEEP STUDIES    Goiter  -     US-SOFT TISSUES OF HEAD - NECK; Future    Sleep apnea, unspecified type  -     REFERRAL TO SLEEP STUDIES    Fatigue, unspecified type  -     REFERRAL TO SLEEP STUDIES    Neck pain  -     US-SOFT TISSUES OF HEAD - NECK; Future  -     cyclobenzaprine (FLEXERIL) 5 MG tablet; Take 1-2 Tabs by mouth 3 times a day as needed.    Gastroesophageal reflux disease without esophagitis  -     omeprazole (PRILOSEC) 40 MG delayed-release capsule; Take 1 Cap by mouth every day.    -Referral sent for sleep study which she will call and schedule, call and schedule the dermatology referral, call and schedule behavioral health referral for counseling and can also go on psychology today and see the articles on there and self refer for people take her insurance or talk to her  if he will take her insurance and can also and I recommend to try headspace srikanth or Buddify before bedtime.  Headaches seem to be from neck tension and would recommend to do the neck exercises as indicated in the patient instruction form and to do over-the-counter icy  rubs or patches on her neck before bed and massage and as needed to take 1 to 2 tablets of muscle relaxer before bed and ibuprofen 600 mg as needed during the day but not to take too much due to kidney effects and stomach ulcers and will start for acid reflux omeprazole 40 mg once a day daily on empty stomach in the morning for 30 days with 1 refill and if that helps then will reduce to 20 mg after.  Do yoga stretching and exercises and yoga  breathing for helping to get oxygen to the brain to decrease headaches and drink enough water.  Will also do a neck ultrasound for her possible increased thyroid.  Tdap vaccine given.  Continue hydrochlorothiazide 25 mg and check blood pressure 1-2 times a week with blood pressure goal of 120s over 70s and check this heart rate and bring the paper of your blood pressure and heart rate at the next appointment sometimes in the morning, afternoon, evening.  For your allergies do daily over-the-counter saline rinses or [place] pot twice a day once in the morning and once at night to clear that area and then can do over-the-counter Flonase with steroids which is may be more helpful than Nasacort 1 to 2 sprays in each nostril in the morning and daily for prevention do Singulair montelukast 10 mg at night and then can do loratadine Claritin over-the-counter during the daytime.  We will see how her allergies and acid reflux and headaches do and get the labs and ultrasound done and follow-up at your well woman exam and depression is stable and no medication needed right now and will try with counseling first.  ER precautions given if any chest pain, shortness of breath, passing out, thoughts to hurt yourself or others then please go to ER or call 911.    Return in about 6 weeks (around 6/11/2019), or labs/US/HA/GERD/allergies/Depression, for Long, 40 min appnt, Well Women Check with Pap.    Neck Exercises  Neck exercises can be important for many reasons:  · They can help you to improve and maintain flexibility in your neck. This can be especially important as you age.  · They can help to make your neck stronger. This can make movement easier.  · They can reduce or prevent neck pain.  · They may help your upper back.  Ask your health care provider which neck exercises would be best for you.  Exercises  Neck Press   Repeat this exercise 10 times. Do it first thing in the morning and right before bed or as told by your health  care provider.  1. Lie on your back on a firm bed or on the floor with a pillow under your head.  2. Use your neck muscles to push your head down on the pillow and straighten your spine.  3. Hold the position as well as you can. Keep your head facing up and your chin tucked.  4. Slowly count to 5 while holding this position.  5. Relax for a few seconds. Then repeat.  Isometric Strengthening   Do a full set of these exercises 2 times a day or as told by your health care provider.  1. Sit in a supportive chair and place your hand on your forehead.  2. Push forward with your head and neck while pushing back with your hand. Hold for 10 seconds.  3. Relax. Then repeat the exercise 3 times.  4. Next, do the sequence again, this time putting your hand against the back of your head. Use your head and neck to push backward against the hand pressure.  5. Finally, do the same exercise on either side of your head, pushing sideways against the pressure of your hand.  Prone Head Lifts   Repeat this exercise 5 times. Do this 2 times a day or as told by your health care provider.  1. Lie face-down, resting on your elbows so that your chest and upper back are raised.  2. Start with your head facing downward, near your chest. Position your chin either on or near your chest.  3. Slowly lift your head upward. Lift until you are looking straight ahead. Then continue lifting your head as far back as you can stretch.  4. Hold your head up for 5 seconds. Then slowly lower it to your starting position.  Supine Head Lifts   Repeat this exercise 8-10 times. Do this 2 times a day or as told by your health care provider.  1. Lie on your back, bending your knees to point to the ceiling and keeping your feet flat on the floor.  2. Lift your head slowly off the floor, raising your chin toward your chest.  3. Hold for 5 seconds.  4. Relax and repeat.  Scapular Retraction   Repeat this exercise 5 times. Do this 2 times a day or as told by your  health care provider.  1. Stand with your arms at your sides. Look straight ahead.  2. Slowly pull both shoulders backward and downward until you feel a stretch between your shoulder blades in your upper back.  3. Hold for 10-30 seconds.  4. Relax and repeat.  Contact a health care provider if:  · Your neck pain or discomfort gets much worse when you do an exercise.  · Your neck pain or discomfort does not improve within 2 hours after you exercise.  If you have any of these problems, stop exercising right away. Do not do the exercises again unless your health care provider says that you can.  Get help right away if:  · You develop sudden, severe neck pain. If this happens, stop exercising right away. Do not do the exercises again unless your health care provider says that you can.  Exercises  Neck Stretch   Repeat this exercise 3-5 times.  1. Do this exercise while standing or while sitting in a chair.  2. Place your feet flat on the floor, shoulder-width apart.  3. Slowly turn your head to the right. Turn it all the way to the right so you can look over your right shoulder. Do not tilt or tip your head.  4. Hold this position for 10-30 seconds.  5. Slowly turn your head to the left, to look over your left shoulder.  6. Hold this position for 10-30 seconds.  Neck Retraction   Repeat this exercise 8-10 times. Do this 3-4 times a day or as told by your health care provider.  1. Do this exercise while standing or while sitting in a sturdy chair.  2. Look straight ahead. Do not bend your neck.  3. Use your fingers to push your chin backward. Do not bend your neck for this movement. Continue to face straight ahead. If you are doing the exercise properly, you will feel a slight sensation in your throat and a stretch at the back of your neck.  4. Hold the stretch for 1-2 seconds. Relax and repeat.  This information is not intended to replace advice given to you by your health care provider. Make sure you discuss any  questions you have with your health care provider.    Allergic Rhinitis  Allergic rhinitis is when the mucous membranes in the nose respond to allergens. Allergens are particles in the air that cause your body to have an allergic reaction. This causes you to release allergic antibodies. Through a chain of events, these eventually cause you to release histamine into the blood stream. Although meant to protect the body, it is this release of histamine that causes your discomfort, such as frequent sneezing, congestion, and an itchy, runny nose.  What are the causes?  Seasonal allergic rhinitis (hay fever) is caused by pollen allergens that may come from grasses, trees, and weeds. Year-round allergic rhinitis (perennial allergic rhinitis) is caused by allergens such as house dust mites, pet dander, and mold spores.  What are the signs or symptoms?  · Nasal stuffiness (congestion).  · Itchy, runny nose with sneezing and tearing of the eyes.  How is this diagnosed?  Your health care provider can help you determine the allergen or allergens that trigger your symptoms. If you and your health care provider are unable to determine the allergen, skin or blood testing may be used. Your health care provider will diagnose your condition after taking your health history and performing a physical exam. Your health care provider may assess you for other related conditions, such as asthma, pink eye, or an ear infection.  How is this treated?  Allergic rhinitis does not have a cure, but it can be controlled by:  · Medicines that block allergy symptoms. These may include allergy shots, nasal sprays, and oral antihistamines.  · Avoiding the allergen.  Hay fever may often be treated with antihistamines in pill or nasal spray forms. Antihistamines block the effects of histamine. There are over-the-counter medicines that may help with nasal congestion and swelling around the eyes. Check with your health care provider before taking or  giving this medicine.  If avoiding the allergen or the medicine prescribed do not work, there are many new medicines your health care provider can prescribe. Stronger medicine may be used if initial measures are ineffective. Desensitizing injections can be used if medicine and avoidance does not work. Desensitization is when a patient is given ongoing shots until the body becomes less sensitive to the allergen. Make sure you follow up with your health care provider if problems continue.  Follow these instructions at home:  It is not possible to completely avoid allergens, but you can reduce your symptoms by taking steps to limit your exposure to them. It helps to know exactly what you are allergic to so that you can avoid your specific triggers.  Contact a health care provider if:  · You have a fever.  · You develop a cough that does not stop easily (persistent).  · You have shortness of breath.  · You start wheezing.  · Symptoms interfere with normal daily activities.  This information is not intended to replace advice given to you by your health care provider. Make sure you discuss any questions you have with your health care provider.    Introduction  Your blood pressure on this visit to the emergency department or clinic is elevated. This does not necessarily mean you have high blood pressure (hypertension), but it does mean that your blood pressure needs to be rechecked. Many times your blood pressure can increase due to illness, pain, anxiety, or other factors.  We recommend that you get a series of blood pressure readings done over a period of 5 days. It is best to get a reading in the morning and one in the evening. You should make sure to sit and relax for 1-5 minutes before the reading is taken. Write the readings down and make a follow-up appointment with your health care provider to discuss the results. If there is not a free clinic or a drug store with a blood-pressure-taking machine near you, you can  purchase blood-pressure-taking equipment from a drug store. Having one in the home allows you the convenience of taking your blood pressure while you are home and relaxed.  BLOOD PRESSURE LOG   Date: _______________________  · a.m. _____________________  · p.m. _____________________  Date: _______________________  · a.m. _____________________  · p.m. _____________________  Date: _______________________  · a.m. _____________________  · p.m. _____________________  Date: _______________________  · a.m. _____________________  · p.m. _____________________  Date: _______________________  · a.m. _____________________  · p.m. _____________________  This information is not intended to replace advice given to you by your health care provider. Make sure you discuss any questions you have with your health care provider.  Document Released: 09/15/2004 Document Revised: 07/07/2017 Document Reviewed: 02/10/2015  © 2017 [employer]  .

## 2019-05-23 DIAGNOSIS — K21.9 GASTROESOPHAGEAL REFLUX DISEASE WITHOUT ESOPHAGITIS: ICD-10-CM

## 2019-05-23 DIAGNOSIS — J30.1 SEASONAL ALLERGIC RHINITIS DUE TO POLLEN: ICD-10-CM

## 2019-05-23 RX ORDER — OMEPRAZOLE 40 MG/1
CAPSULE, DELAYED RELEASE ORAL
Qty: 90 CAP | Refills: 0 | Status: SHIPPED | OUTPATIENT
Start: 2019-05-23 | End: 2019-07-31 | Stop reason: SDUPTHER

## 2019-05-23 RX ORDER — FLUTICASONE PROPIONATE 50 MCG
1 SPRAY, SUSPENSION (ML) NASAL DAILY
Qty: 3 BOTTLE | Refills: 0 | Status: SHIPPED | OUTPATIENT
Start: 2019-05-23 | End: 2019-07-31 | Stop reason: SDUPTHER

## 2019-05-23 NOTE — TELEPHONE ENCOUNTER
Was the patient seen in the last year in this department? Yes    Does patient have an active prescription for medications requested? Yes    Received Request Via: Pharmacy      Pt met protocol?: Yes   Pt last ov 4/2019 flonase due at the end of tess

## 2019-05-25 ENCOUNTER — HOSPITAL ENCOUNTER (OUTPATIENT)
Dept: LAB | Facility: MEDICAL CENTER | Age: 39
End: 2019-05-25
Attending: FAMILY MEDICINE
Payer: COMMERCIAL

## 2019-05-25 DIAGNOSIS — E55.9 VITAMIN D DEFICIENCY: ICD-10-CM

## 2019-05-25 DIAGNOSIS — E53.8 VITAMIN B12 DEFICIENCY: ICD-10-CM

## 2019-05-25 LAB
25(OH)D3 SERPL-MCNC: 14 NG/ML (ref 30–100)
VIT B12 SERPL-MCNC: 314 PG/ML (ref 211–911)

## 2019-05-25 PROCEDURE — 82306 VITAMIN D 25 HYDROXY: CPT

## 2019-05-25 PROCEDURE — 82607 VITAMIN B-12: CPT

## 2019-05-25 PROCEDURE — 36415 COLL VENOUS BLD VENIPUNCTURE: CPT

## 2019-06-10 ENCOUNTER — HOSPITAL ENCOUNTER (OUTPATIENT)
Dept: RADIOLOGY | Facility: MEDICAL CENTER | Age: 39
End: 2019-06-10
Attending: FAMILY MEDICINE
Payer: COMMERCIAL

## 2019-06-10 DIAGNOSIS — E04.9 GOITER: ICD-10-CM

## 2019-06-10 DIAGNOSIS — M54.2 NECK PAIN: ICD-10-CM

## 2019-06-10 PROCEDURE — 76536 US EXAM OF HEAD AND NECK: CPT

## 2019-06-12 ENCOUNTER — TELEPHONE (OUTPATIENT)
Dept: MEDICAL GROUP | Facility: PHYSICIAN GROUP | Age: 39
End: 2019-06-12

## 2019-07-16 ENCOUNTER — SLEEP CENTER VISIT (OUTPATIENT)
Dept: SLEEP MEDICINE | Facility: MEDICAL CENTER | Age: 39
End: 2019-07-16
Payer: COMMERCIAL

## 2019-07-16 ENCOUNTER — HOSPITAL ENCOUNTER (OUTPATIENT)
Dept: LAB | Facility: MEDICAL CENTER | Age: 39
End: 2019-07-16
Attending: FAMILY MEDICINE
Payer: COMMERCIAL

## 2019-07-16 VITALS
HEART RATE: 75 BPM | HEIGHT: 66 IN | DIASTOLIC BLOOD PRESSURE: 74 MMHG | WEIGHT: 220 LBS | BODY MASS INDEX: 35.36 KG/M2 | OXYGEN SATURATION: 97 % | RESPIRATION RATE: 15 BRPM | SYSTOLIC BLOOD PRESSURE: 118 MMHG

## 2019-07-16 DIAGNOSIS — R51.9 CHRONIC NONINTRACTABLE HEADACHE, UNSPECIFIED HEADACHE TYPE: ICD-10-CM

## 2019-07-16 DIAGNOSIS — I10 ESSENTIAL HYPERTENSION: ICD-10-CM

## 2019-07-16 DIAGNOSIS — G47.30 SLEEP DISORDER BREATHING: ICD-10-CM

## 2019-07-16 DIAGNOSIS — E66.9 OBESITY (BMI 30-39.9): ICD-10-CM

## 2019-07-16 DIAGNOSIS — G89.29 CHRONIC NONINTRACTABLE HEADACHE, UNSPECIFIED HEADACHE TYPE: ICD-10-CM

## 2019-07-16 DIAGNOSIS — E83.10 IRON METABOLISM DISEASE: ICD-10-CM

## 2019-07-16 DIAGNOSIS — G25.81 RLS (RESTLESS LEGS SYNDROME): ICD-10-CM

## 2019-07-16 LAB — FERRITIN SERPL-MCNC: 12.2 NG/ML (ref 10–291)

## 2019-07-16 PROCEDURE — 99244 OFF/OP CNSLTJ NEW/EST MOD 40: CPT | Performed by: FAMILY MEDICINE

## 2019-07-16 PROCEDURE — 82728 ASSAY OF FERRITIN: CPT

## 2019-07-16 PROCEDURE — 36415 COLL VENOUS BLD VENIPUNCTURE: CPT

## 2019-07-16 RX ORDER — ZOLPIDEM TARTRATE 5 MG/1
5 TABLET ORAL NIGHTLY PRN
Qty: 2 TAB | Refills: 0 | Status: SHIPPED | OUTPATIENT
Start: 2019-07-16 | End: 2019-07-17

## 2019-07-16 NOTE — PROGRESS NOTES
"     [facility]  Consult Note     Date: 7/16/2019 / Time: 9:14 AM    Patient ID:   Name:             Татьяна Velez     YOB: 1980  Age:                 39 y.o.  female   MRN:               [id number]      Thank you for requesting a sleep medicine consultation on Татьяна Velez at the sleep center. She presents today with the chief complaints of frequent morning HA and occasional excessive daytime sleepiness. She is referred by Dr. Pradhan for evaluation and treatment of sleep disorder breathing.     HISTORY OF PRESENT ILLNESS:       At night, Татьяна Velez goes to bed around 10 pm on weekdays and around 11 pm-12am on the weekends. She gets out of bed at 6:30 am on weekdays and at 6-10 am on the weekends.  She averages about 6-8 hrs of sleep on a good night and 4-5 hrs on a bad night. Pt has bad nights about 1 nights per week. She falls asleep within 10-15 minutes. She awakens 1-2 times during the night due to bathroom use and no obvious reason. It takes her few min to fall back asleep.    She is not aware of snoring/witnessed apneas/gasping or choking in sleep.  She has symptoms of restless legs syndrome such as an \"urge to move\". As per pt it has been going for 2-3 years. It usually starts around 9 pm. It rarely interferes with sleep onset but denies with sleep maintenance. She is currently not on any medication for RLS. She has FMH of RLS. She  denies any symptoms of narcolepsy such as sleep paralysis or cataplexy, or any symptoms to suggest parasomnias such as sleep walking or acting out of dreams. She  has not used any medications for the sleep problem.    Overall, she doesnot finds her sleep refreshing. In terms of  excessive daytime sleepiness,  She complains of sleepiness while  at work, while watching TV however while driving. [place] sleepiness scale score is abnormal at  11/24.She does take naps on the weekend. She drinks about 2 caffeinated beverages per " day.      REVIEW OF SYSTEMS:       Constitutional: Denies fevers, Denies weight changes  Eyes: Denies changes in vision, no eye pain  Ears/Nose/Throat/Mouth: Denies nasal congestion or sore throat   Cardiovascular: Denies chest pain or palpitations   Respiratory: Denies shortness of breath , Denies cough  Gastrointestinal/Hepatic: Denies abdominal pain, nausea, vomiting, diarrhea, constipation or GI bleeding   Genitourinary: Denies bladder dysfunction, dysuria or frequency  Musculoskeletal/Rheum: Denies  joint pain and swelling   Skin/Breast: Denies rash  Neurological: Denies headache, confusion, memory loss or focal weakness/parasthesias  Psychiatric: denies mood disorder     Comprehensive review of systems form is reviewed with the patient and is attached in the EMR.     PMH:  has a past medical history of Bronchitis; Chickenpox; GERD (gastroesophageal reflux disease); Goiter (4/30/2019); Hypertension; Melanoma ([facility]); Migraine; Mild depression (HCC) (3/19/2019); Nasal drainage; Tonsillitis; and Venereal disease. She also has no past medical history of Anemia; Anxiety; Arrhythmia; Asthma; Blood transfusion without reported diagnosis; CHF (congestive heart failure) (HCC); Clotting disorder (HCC); COPD (chronic obstructive pulmonary disease) (HCC); Diabetes (HCC); Heart attack (HCC); Heart murmur; IBD (inflammatory bowel disease); Kidney disease; Seizure (HCC); Stroke (HCC); Substance abuse (HCC); or Urinary tract infection.  MEDS:   Current Outpatient Prescriptions:   •  omeprazole (PRILOSEC) 40 MG delayed-release capsule, TAKE 1 CAPSULE BY MOUTH EVERY DAY, Disp: 90 Cap, Rfl: 0  •  montelukast (SINGULAIR) 10 MG Tab, Take 1 Tab by mouth every day., Disp: 30 Tab, Rfl: 3  •  hydroCHLOROthiazide (HYDRODIURIL) 25 MG Tab, TAKE 1 TABLET BY MOUTH EVERY DAY, Disp: 90 Tab, Rfl: 1  •  PAZEO 0.7 % Solution, INSTILL 1 DROP INTO BOTH EYES EVERY MORNING AS DIRECTED, Disp: , Rfl: 6  •  fluticasone (FLONASE) 50 MCG/ACT nasal spray,  "SPRAY 1 SPRAY IN NOSE EVERY DAY., Disp: 3 Bottle, Rfl: 0  •  cyclobenzaprine (FLEXERIL) 5 MG tablet, Take 1-2 Tabs by mouth 3 times a day as needed., Disp: 60 Tab, Rfl: 1  ALLERGIES:   Allergies   Allergen Reactions   • Sulfa Drugs      SURGHX:   Past Surgical History:   Procedure Laterality Date   • SKIN RESURFACING      removal melanoma as kid     SOCHX:  reports that she has never smoked. She has never used smokeless tobacco. She reports that she does not drink alcohol or use drugs.   FH:   Family History   Problem Relation Age of Onset   • Heart Disease Maternal Grandmother    • Heart Disease Paternal Grandfather         heart attack   • Hypertension Mother    • Diabetes Mother    • Obesity Mother    • Hypertension Father    • Obesity Father    • Sleep Apnea Father    • Alcohol/Drug Maternal Grandfather    • Lung Disease Paternal Grandmother    • Heart Disease Paternal Grandmother         many heart attacks           Physical Exam:  Vitals/ General Appearance:   Weight/BMI: Body mass index is 35.51 kg/m².  /74 (BP Location: Right arm, Patient Position: Sitting, BP Cuff Size: Adult)   Pulse 75   Resp 15   Ht 1.676 m (5' 6\")   Wt 99.8 kg (220 lb)   SpO2 97%   Vitals:    07/16/19 0903   BP: 118/74   BP Location: Right arm   Patient Position: Sitting   BP Cuff Size: Adult   Pulse: 75   Resp: 15   SpO2: 97%   Weight: 99.8 kg (220 lb)   Height: 1.676 m (5' 6\")       Pt. is alert and oriented to time, place and person. Cooperative and in no apparent distress.       -Head: Atraumatic, normocephalic.   -. Ears: Normal tympanic membrane and no discharge  -. Nose: No inferior turbinate hypertophy, no septal deviation, no polyp.   -. Throat: Oropharynx appears crowded in that the palate is overhanging (Malam Carola scale 3. Tonsils are 1-2+ enlarged, uvula is large, pharynx not inflamed  -. Neck: Supple. No thyromegaly  -. Chest: Trachea central, no spine deformity   -. Lungs auscultation: B/L good air entry, " vesicular breath sounds, no adventitious sounds  -. Heart auscultation: 1st and 2nd heart sounds normal, regular rhythm. No appreciable murmur.  - Extremities:  no pedal edema.  - Skin: No rash  - NEUROLOGICAL EXAMINATION: On neurological exam, the patient was alert and oriented x3. speech was clear and fluent without dysarthria.      INVESTIGATIONS:       ASSESSMENT AND PLAN     1. She  has symptoms of Obstructive Sleep Apnea (SAMMY). She  has excessive daytime sleepiness that interferes with activites of daily living. She  risk factors for SAMMY include obesit and crowded oropharynx.     The pathophysiology of SAMMY and the increased risk of cardiovascular morbidity from untreated SAMMY is discussed in detail with the patient.      We have discussed diagnostic options including in-laboratory, attended polysomnography and home sleep testing. We have also discussed treatment options including airway pressurization, reconstructive otolaryngologic surgery, dental appliances and weight management.       Subsequently,treatment options will be discussed based on the diagnostic study. Meanwhile, She is urged to avoid supine sleep, weight gain and alcoholic beverages since all of these can worsen SAMMY. She is cautioned against drowsy driving. If She feels sleepy while driving, She must pull over for a break/nap, rather than persist on the road, in the interest of She own safety and that of others on the road.    Plan  -  She  will be scheduled for an overnight PSG to assess sleep related  breathing disorder.    2. HTN: stable and asymptomatic. She is currently on HCTZ.     3. Chronic HA: associated with nausea. It is worse during menstural cycle, It is uncontrolled and has it 2-3 days per week. It does respond to Excedrin migraine.     4. Restless Leg syndrome: currently not on medication. It does not interfere with sleep onset for sleep maintanance. Denies hx of anemia. RLS could be primary or secondary in nature. RLS can be  exacerbated in light of sleep related breathing disorder. Other common cause of RLS is low ferritin level, which were ordered today. We will reassess RLS symptoms once SAMMY is treated and well controlled. Medication options were discussed however deferred on this visit.

## 2019-07-16 NOTE — PATIENT INSTRUCTIONS
Zolpidem tablets  What is this medicine?  ZOLPIDEM (zole PI dem) is used to treat insomnia. This medicine helps you to fall asleep and sleep through the night.  This medicine may be used for other purposes; ask your health care provider or pharmacist if you have questions.  COMMON BRAND NAME(S): Leopoldo  What should I tell my health care provider before I take this medicine?  They need to know if you have any of these conditions:  -depression  -history of drug abuse or addiction  -if you often drink alcohol  -liver disease  -lung or breathing disease  -myasthenia gravis  -sleep apnea  -suicidal thoughts, plans, or attempt; a previous suicide attempt by you or a family member  -an unusual or allergic reaction to zolpidem, other medicines, foods, dyes, or preservatives  -pregnant or trying to get pregnant  -breast-feeding  How should I use this medicine?  Take this medicine by mouth with a glass of water. Follow the directions on the prescription label. It is better to take this medicine on an empty stomach and only when you are ready for bed. Do not take your medicine more often than directed. If you have been taking this medicine for several weeks and suddenly stop taking it, you may get unpleasant withdrawal symptoms. Your doctor or health care professional may want to gradually reduce the dose. Do not stop taking this medicine on your own. Always follow your doctor or health care professional's advice.  A special MedGuide will be given to you by the pharmacist with each prescription and refill. Be sure to read this information carefully each time.  Talk to your pediatrician regarding the use of this medicine in children. Special care may be needed.  Overdosage: If you think you have taken too much of this medicine contact a poison control center or emergency room at once.  NOTE: This medicine is only for you. Do not share this medicine with others.  What if I miss a dose?  This does not apply. This medicine should  "only be taken immediately before going to sleep. Do not take double or extra doses.  What may interact with this medicine?  -alcohol  -antihistamines for allergy, cough and cold  -certain medicines for anxiety or sleep  -certain medicines for depression, like amitriptyline, fluoxetine, sertraline  -certain medicines for fungal infections like ketoconazole and itraconazole  -certain medicines for seizures like phenobarbital, primidone  -ciprofloxacin  -dietary supplements for sleep, like valerian or kava kava  -general anesthetics like halothane, isoflurane, methoxyflurane, propofol  -local anesthetics like lidocaine, pramoxine, tetracaine  -medicines that relax muscles for surgery  -narcotic medicines for pain  -phenothiazines like chlorpromazine, mesoridazine, prochlorperazine, thioridazine  -rifampin  This list may not describe all possible interactions. Give your health care provider a list of all the medicines, herbs, non-prescription drugs, or dietary supplements you use. Also tell them if you smoke, drink alcohol, or use illegal drugs. Some items may interact with your medicine.  What should I watch for while using this medicine?  Visit your doctor or health care professional for regular checks on your progress. Keep a regular sleep schedule by going to bed at about the same time each night. Avoid caffeine-containing drinks in the evening hours. When sleep medicines are used every night for more than a few weeks, they may stop working. Talk to your doctor if you still have trouble sleeping.  After taking this medicine for sleep, you may get up out of bed while not being fully awake and do an activity that you do not know you are doing. The next morning, you may have no memory of the event. Activities such as driving a car (\"sleep-driving\"), making and eating food, talking on the phone, sexual activity, and sleep-walking have been reported. Call your doctor right away if you find out you have done any of these " activities. Do not take this medicine if you have used alcohol that evening or before bed or taken another medicine for sleep since your risk of doing these sleep-related activities will be increased.  Wait for at least 8 hours after you take a dose before driving or doing other activities that require full mental alertness. Do not take this medicine unless you are able to stay in bed for a full night (7 to 8 hours) before you must be active again. You may have a decrease in mental alertness the day after use, even if you feel that you are fully awake. Tell your doctor if you will need to perform activities requiring full alertness, such as driving, the next day. Do not stand or sit up quickly after taking this medicine, especially if you are an older patient. This reduces the risk of dizzy or fainting spells.  If you or your family notice any changes in your behavior, such as new or worsening depression, thoughts of harming yourself, anxiety, other unusual or disturbing thoughts, or memory loss, call your doctor right away.  After you stop taking this medicine, you may have trouble falling asleep. This is called rebound insomnia. This problem usually goes away on its own after 1 or 2 nights.  What side effects may I notice from receiving this medicine?  Side effects that you should report to your doctor or health care professional as soon as possible:  -allergic reactions like skin rash, itching or hives, swelling of the face, lips, or tongue  -breathing problems  -changes in vision  -confusion  -depressed mood or other changes in moods or emotions  -feeling faint or lightheaded, falls  -hallucinations  -loss of balance or coordination  -loss of memory  -numbness or tingling of the tongue  -restlessness, excitability, or feelings of anxiety or agitation  -signs and symptoms of liver injury like dark yellow or brown urine; general ill feeling or flu-like symptoms; light-colored stools; loss of appetite; nausea;  right upper belly pain; unusually weak or tired; yellowing of the eyes or skin  -suicidal thoughts  -unusual activities while asleep like driving, eating, making phone calls, or sexual activity  Side effects that usually do not require medical attention (report to your doctor or health care professional if they continue or are bothersome):  -dizziness  -drowsiness the day after you take this medicine  -headache  This list may not describe all possible side effects. Call your doctor for medical advice about side effects. You may report side effects to FDA at 2-894-FDA-9316.  Where should I keep my medicine?  Keep out of the reach of children. This medicine can be abused. Keep your medicine in a safe place to protect it from theft. Do not share this medicine with anyone. Selling or giving away this medicine is dangerous and against the law.  This medicine may cause accidental overdose and death if taken by other adults, children, or pets. Mix any unused medicine with a substance like cat litter or coffee grounds. Then throw the medicine away in a sealed container like a sealed bag or a coffee can with a lid. Do not use the medicine after the expiration date.  Store at room temperature between 20 and 25 degrees C (68 and 77 degrees F).  NOTE: This sheet is a summary. It may not cover all possible information. If you have questions about this medicine, talk to your doctor, pharmacist, or health care provider.  © 2018 Elsevier/Gold Standard (2017-03-22 14:38:20)  Sleep Apnea  Sleep apnea is a condition in which breathing pauses or becomes shallow during sleep. Episodes of sleep apnea usually last 10 seconds or longer, and they may occur as many as 20 times an hour. Sleep apnea disrupts your sleep and keeps your body from getting the rest that it needs. This condition can increase your risk of certain health problems, including:  · Heart attack.  · Stroke.  · Obesity.  · Diabetes.  · Heart failure.  · Irregular  heartbeat.  There are three kinds of sleep apnea:  · Obstructive sleep apnea. This kind is caused by a blocked or collapsed airway.  · Central sleep apnea. This kind happens when the part of the brain that controls breathing does not send the correct signals to the muscles that control breathing.  · Mixed sleep apnea. This is a combination of obstructive and central sleep apnea.  What are the causes?  The most common cause of this condition is a collapsed or blocked airway. An airway can collapse or become blocked if:  · Your throat muscles are abnormally relaxed.  · Your tongue and tonsils are larger than normal.  · You are overweight.  · Your airway is smaller than normal.  What increases the risk?  This condition is more likely to develop in people who:  · Are overweight.  · Smoke.  · Have a smaller than normal airway.  · Are elderly.  · Are male.  · Drink alcohol.  · Take sedatives or tranquilizers.  · Have a family history of sleep apnea.  What are the signs or symptoms?  Symptoms of this condition include:  · Trouble staying asleep.  · Daytime sleepiness and tiredness.  · Irritability.  · Loud snoring.  · Morning headaches.  · Trouble concentrating.  · Forgetfulness.  · Decreased interest in sex.  · Unexplained sleepiness.  · Mood swings.  · Personality changes.  · Feelings of depression.  · Waking up often during the night to urinate.  · Dry mouth.  · Sore throat.  How is this diagnosed?  This condition may be diagnosed with:  · A medical history.  · A physical exam.  · A series of tests that are done while you are sleeping (sleep study). These tests are usually done in a sleep lab, but they may also be done at home.  How is this treated?  Treatment for this condition aims to restore normal breathing and to ease symptoms during sleep. It may involve managing health issues that can affect breathing, such as high blood pressure or obesity. Treatment may include:  · Sleeping on your side.  · Using a decongestant  if you have nasal congestion.  · Avoiding the use of depressants, including alcohol, sedatives, and narcotics.  · Losing weight if you are overweight.  · Making changes to your diet.  · Quitting smoking.  · Using a device to open your airway while you sleep, such as:  ¨ An oral appliance. This is a custom-made mouthpiece that shifts your lower jaw forward.  ¨ A continuous positive airway pressure (CPAP) device. This device delivers oxygen to your airway through a mask.  ¨ A nasal expiratory positive airway pressure (EPAP) device. This device has valves that you put into each nostril.  ¨ A bi-level positive airway pressure (BPAP) device. This device delivers oxygen to your airway through a mask.  · Surgery if other treatments do not work. During surgery, excess tissue is removed to create a wider airway.  It is important to get treatment for sleep apnea. Without treatment, this condition can lead to:  · High blood pressure.  · Coronary artery disease.  · (Men) An inability to achieve or maintain an erection (impotence).  · Reduced thinking abilities.  Follow these instructions at home:  · Make any lifestyle changes that your health care provider recommends.  · Eat a healthy, well-balanced diet.  · Take over-the-counter and prescription medicines only as told by your health care provider.  · Avoid using depressants, including alcohol, sedatives, and narcotics.  · Take steps to lose weight if you are overweight.  · If you were given a device to open your airway while you sleep, use it only as told by your health care provider.  · Do not use any tobacco products, such as cigarettes, chewing tobacco, and e-cigarettes. If you need help quitting, ask your health care provider.  · Keep all follow-up visits as told by your health care provider. This is important.  Contact a health care provider if:  · The device that you received to open your airway during sleep is uncomfortable or does not seem to be working.  · Your  symptoms do not improve.  · Your symptoms get worse.  Get help right away if:  · You develop chest pain.  · You develop shortness of breath.  · You develop discomfort in your back, arms, or stomach.  · You have trouble speaking.  · You have weakness on one side of your body.  · You have drooping in your face.  These symptoms may represent a serious problem that is an emergency. Do not wait to see if the symptoms will go away. Get medical help right away. Call your local emergency services (911 in the [place] Do not drive yourself to the hospital.   This information is not intended to replace advice given to you by your health care provider. Make sure you discuss any questions you have with your health care provider.  Document Released: 12/08/2003 Document Revised: 08/13/2017 Document Reviewed: 09/26/2016  Elsevier Interactive Patient Education © 2017 [employer] Inc[employer]

## 2019-07-23 ENCOUNTER — APPOINTMENT (RX ONLY)
Dept: URBAN - METROPOLITAN AREA CLINIC 22 | Facility: CLINIC | Age: 39
Setting detail: DERMATOLOGY
End: 2019-07-23

## 2019-07-23 DIAGNOSIS — L90.5 SCAR CONDITIONS AND FIBROSIS OF SKIN: ICD-10-CM

## 2019-07-23 DIAGNOSIS — L72.0 EPIDERMAL CYST: ICD-10-CM

## 2019-07-23 DIAGNOSIS — D22 MELANOCYTIC NEVI: ICD-10-CM

## 2019-07-23 PROBLEM — D22.5 MELANOCYTIC NEVI OF TRUNK: Status: ACTIVE | Noted: 2019-07-23

## 2019-07-23 PROBLEM — I10 ESSENTIAL (PRIMARY) HYPERTENSION: Status: ACTIVE | Noted: 2019-07-23

## 2019-07-23 PROCEDURE — ? DIAGNOSIS COMMENT

## 2019-07-23 PROCEDURE — 99203 OFFICE O/P NEW LOW 30 MIN: CPT

## 2019-07-23 PROCEDURE — ? COUNSELING

## 2019-07-23 PROCEDURE — ? REFERRAL CORRESPONDENCE

## 2019-07-23 ASSESSMENT — LOCATION DETAILED DESCRIPTION DERM
LOCATION DETAILED: LEFT MEDIAL BREAST 9-10:00 REGION
LOCATION DETAILED: LEFT INFERIOR MEDIAL MIDBACK
LOCATION DETAILED: SUPERIOR THORACIC SPINE

## 2019-07-23 ASSESSMENT — LOCATION ZONE DERM: LOCATION ZONE: TRUNK

## 2019-07-23 ASSESSMENT — LOCATION SIMPLE DESCRIPTION DERM
LOCATION SIMPLE: UPPER BACK
LOCATION SIMPLE: LEFT LOWER BACK
LOCATION SIMPLE: LEFT BREAST

## 2019-07-31 ENCOUNTER — OFFICE VISIT (OUTPATIENT)
Dept: MEDICAL GROUP | Facility: PHYSICIAN GROUP | Age: 39
End: 2019-07-31
Payer: COMMERCIAL

## 2019-07-31 VITALS
DIASTOLIC BLOOD PRESSURE: 84 MMHG | OXYGEN SATURATION: 98 % | HEART RATE: 73 BPM | BODY MASS INDEX: 36.49 KG/M2 | HEIGHT: 65 IN | RESPIRATION RATE: 14 BRPM | TEMPERATURE: 99.3 F | SYSTOLIC BLOOD PRESSURE: 132 MMHG | WEIGHT: 219 LBS

## 2019-07-31 DIAGNOSIS — M54.2 NECK PAIN: ICD-10-CM

## 2019-07-31 DIAGNOSIS — E53.8 VITAMIN B12 DEFICIENCY: ICD-10-CM

## 2019-07-31 DIAGNOSIS — E04.1 LEFT THYROID NODULE: ICD-10-CM

## 2019-07-31 DIAGNOSIS — I10 ESSENTIAL HYPERTENSION: ICD-10-CM

## 2019-07-31 DIAGNOSIS — J30.1 SEASONAL ALLERGIC RHINITIS DUE TO POLLEN: ICD-10-CM

## 2019-07-31 DIAGNOSIS — E55.9 VITAMIN D DEFICIENCY: ICD-10-CM

## 2019-07-31 DIAGNOSIS — K21.9 GASTROESOPHAGEAL REFLUX DISEASE WITHOUT ESOPHAGITIS: ICD-10-CM

## 2019-07-31 DIAGNOSIS — G25.81 RLS (RESTLESS LEGS SYNDROME): ICD-10-CM

## 2019-07-31 PROBLEM — R00.0 TACHYCARDIA: Status: RESOLVED | Noted: 2019-03-19 | Resolved: 2019-07-31

## 2019-07-31 PROCEDURE — 99214 OFFICE O/P EST MOD 30 MIN: CPT | Performed by: FAMILY MEDICINE

## 2019-07-31 RX ORDER — OMEPRAZOLE 40 MG/1
40 CAPSULE, DELAYED RELEASE ORAL
Qty: 90 CAP | Refills: 1 | Status: SHIPPED | OUTPATIENT
Start: 2019-07-31 | End: 2019-10-08

## 2019-07-31 RX ORDER — MONTELUKAST SODIUM 10 MG/1
10 TABLET ORAL DAILY
Qty: 30 TAB | Refills: 3 | Status: SHIPPED | OUTPATIENT
Start: 2019-07-31 | End: 2019-10-08 | Stop reason: SDUPTHER

## 2019-07-31 RX ORDER — ERGOCALCIFEROL 1.25 MG/1
CAPSULE ORAL
Qty: 12 CAP | Refills: 1 | Status: SHIPPED | OUTPATIENT
Start: 2019-07-31 | End: 2021-05-11

## 2019-07-31 RX ORDER — FLUTICASONE PROPIONATE 50 MCG
1 SPRAY, SUSPENSION (ML) NASAL DAILY
Qty: 3 BOTTLE | Refills: 1 | Status: SHIPPED | OUTPATIENT
Start: 2019-07-31 | End: 2019-10-08 | Stop reason: SDUPTHER

## 2019-07-31 RX ORDER — HYDROCHLOROTHIAZIDE 25 MG/1
25 TABLET ORAL
Qty: 90 TAB | Refills: 1 | Status: SHIPPED | OUTPATIENT
Start: 2019-07-31 | End: 2019-10-08 | Stop reason: SDUPTHER

## 2019-07-31 NOTE — PATIENT INSTRUCTIONS
Diagnoses and all orders for this visit:    Neck pain  -     REFERRAL TO PHYSICAL THERAPY Reason for Therapy: Eval/Treat/Report    Left thyroid nodule    Vitamin D deficiency  -     ergocalciferol (DRISDOL) 56634 UNIT capsule; Take one tab po once a week.    Vitamin B12 deficiency    RLS (restless legs syndrome)    Seasonal allergic rhinitis due to pollen  -     montelukast (SINGULAIR) 10 MG Tab; Take 1 Tab by mouth every day.  -     fluticasone (FLONASE) 50 MCG/ACT nasal spray; Spray 1 Spray in nose every day.    Gastroesophageal reflux disease without esophagitis  -     omeprazole (PRILOSEC) 40 MG delayed-release capsule; Take 1 Cap by mouth every day.    Essential hypertension  -     hydroCHLOROthiazide (HYDRODIURIL) 25 MG Tab; Take 1 Tab by mouth every day.    BMI 36.0-36.9,adult    -We will follow-up with pulmonary and sleep for her possible sleep apnea and sleep study and RLS.  Her ferritin was 12.2 on the lower end of normal and can try over-the-counter iron once daily and see if this helps.  For vitamin B12 I like values around 500 so hers is 314 so can take 500 mcg of vitamin B12 over-the-counter every day for now and then transition to every second day after 3 to 6 months.  For vitamin D is 14 and would like it around 50 so she could do high-dose 50,000 units vitamin D once a week at nighttime for 6 months with 3 months given with 1 refill and then she could transition to low overdose of vitamin D after that 2000 units daily.  We will continue her omeprazole for heartburn and she will watch the food she eats and hopefully with losing weight and diet this will improve over time and we could then transition her to 20 mg of omeprazole in the future.  She could continue Flexeril as needed for the neck pain and try massage therapy and we will send her for physical therapy and she could also check the reviews and try a TENS unit for her neck and please also do the neck stretching and see the eye doctor for  possible headaches as well to and will continue hydrochlorothiazide 25 mg daily for now with a desired blood pressure of 120s over 70s and if it starts getting higher lower she will let me know and we can adjust as needed.  Her allergies are stable and we will refill her Flonase and montelukast.  She will see me back and we will do her well woman exam and Pap at that visit and see how her blood pressures doing and her physical therapy is doing.  She will continue to work on her diet and weight and try for 5 to 10 pound weight loss per month but not to lose too much too quickly but to work on this slowly as well.    Return in about 6 weeks (around 9/11/2019) for Well Women Check with Pap.

## 2019-07-31 NOTE — PROGRESS NOTES
cc: Vitamin D deficiency, mild vitamin B12 deficiency    Subjective:     Татьяна Velez is a 39 y.o. female presenting She is currently on heavy menstrual cycle so we will reschedule her well woman exam with Pap.  Is not on any birth control.  She did do some lab work in May which showed vitamin D low at 14 and vitamin B12 low at 314 and ferritin iron within normal limits at 12.2.  She has her sleep study scheduled in August and her follow-up in September with sleep and pulmonary.  They did order the ferritin level as well due to her restless leg and possible sleep apnea.  She had her annual skin cancer screening with dermatology and everything went within normal limits and she will go back in 1 year.  Twice a week she is using Flexeril for neck tension and headaches.  She is considering to do massage therapy for her neck and would be interested in physical therapy for her neck.  She has done physical therapy for other pain issues which has helped.  She does not currently have a TENS unit.  He is taking omeprazole 40 mg daily for acid reflux.  She is taking Excedrin or ibuprofen for her neck pain but not daily right now do to her menstrual cramps.  Her allergies are doing better right now.  Depression is doing okay and stable without any medications or need for this. Blood pressure ranges 118-130s/80s and average 120s/80s.  She has changed her diet with her family and watching what she eats and when she was last seen in April her weight was 224 pounds and today is 219 pounds.  She reports that she did lose more than this but been on some medications and gained some of it back but she is going to continue to work on this.  They did find a single 1 cm left thyroid nodule that was ACR tirades points for TR for moderately suspicious and they do recommend a follow-up ultrasound depending on symptoms in about 1 to 3 years.    Review of systems:     Constitutional: Negative for fever, chills and negative fatigue.   HENT:  "Negative for sinus pressure  Eyes: Negative for blurriness  Respiratory: Negative for cough and shortness of breath, negative for exertional shortness of breath  Cardiovascular: Negative for leg swelling, negative for palpitations, negative for chest pain  Gastrointestinal: Negative for nausea, vomiting, abdominal pain,   Genitourinary: Negative for dysuria and hematuria.   Neurological: Negative for dizziness, focal weakness and headaches.   Endo/Heme/Allergies: Denies bleeding, bruising, and recurrent allergies.  Psychiatric/Behavioral: Negative for depression and anxiety.        Current Outpatient Medications:   •  montelukast (SINGULAIR) 10 MG Tab, Take 1 Tab by mouth every day., Disp: 30 Tab, Rfl: 3  •  fluticasone (FLONASE) 50 MCG/ACT nasal spray, Spray 1 Spray in nose every day., Disp: 3 Bottle, Rfl: 1  •  hydroCHLOROthiazide (HYDRODIURIL) 25 MG Tab, Take 1 Tab by mouth every day., Disp: 90 Tab, Rfl: 1  •  omeprazole (PRILOSEC) 40 MG delayed-release capsule, Take 1 Cap by mouth every day., Disp: 90 Cap, Rfl: 1  •  ergocalciferol (DRISDOL) 75765 UNIT capsule, Take one tab po once a week., Disp: 12 Cap, Rfl: 1  •  cyclobenzaprine (FLEXERIL) 5 MG tablet, Take 1-2 Tabs by mouth 3 times a day as needed., Disp: 60 Tab, Rfl: 1  •  PAZEO 0.7 % Solution, INSTILL 1 DROP INTO BOTH EYES EVERY MORNING AS DIRECTED, Disp: , Rfl: 6    Allergies, past medical history, past surgical history, family history, social history reviewed and updated    Objective:     Vitals: /84 (BP Location: Left arm, Patient Position: Sitting, BP Cuff Size: Adult)   Pulse 73   Temp 37.4 °C (99.3 °F) (Temporal)   Resp 14   Ht 1.651 m (5' 5\")   Wt 99.3 kg (219 lb)   SpO2 98%   BMI 36.44 kg/m²   General: Alert, pleasant, NAD  HEENT: Normocephalic.  Nontraumatic. EOMI, no icterus or pallor.  Conjunctivae and lids normal. External ears normal.  Heart: Regular rate and rhythm.  S1 and S2 normal.  No murmurs appreciated.  Respiratory: Normal " respiratory effort.  Clear to auscultation bilaterally.  Skin: Warm, dry, no rashes.  Musculoskeletal: Gait is normal.  Moves all extremities well.  Extremities: No leg edema.   Psych:  Affect/mood is normal, judgement is good, memory is intact, grooming is appropriate.    Assessment/Plan:     Diagnoses and all orders for this visit:    Neck pain  -     REFERRAL TO PHYSICAL THERAPY Reason for Therapy: Eval/Treat/Report    Left thyroid nodule    Vitamin D deficiency  -     ergocalciferol (DRISDOL) 73902 UNIT capsule; Take one tab po once a week.    Vitamin B12 deficiency    RLS (restless legs syndrome)    Seasonal allergic rhinitis due to pollen  -     montelukast (SINGULAIR) 10 MG Tab; Take 1 Tab by mouth every day.  -     fluticasone (FLONASE) 50 MCG/ACT nasal spray; Spray 1 Spray in nose every day.    Gastroesophageal reflux disease without esophagitis  -     omeprazole (PRILOSEC) 40 MG delayed-release capsule; Take 1 Cap by mouth every day.    Essential hypertension  -     hydroCHLOROthiazide (HYDRODIURIL) 25 MG Tab; Take 1 Tab by mouth every day.    BMI 36.0-36.9,adult    -We will follow-up with pulmonary and sleep for her possible sleep apnea and sleep study and RLS.  Her ferritin was 12.2 on the lower end of normal and can try over-the-counter iron once daily and see if this helps.  For vitamin B12 I like values around 500 so hers is 314 so can take 500 mcg of vitamin B12 over-the-counter every day for now and then transition to every second day after 3 to 6 months.  For vitamin D is 14 and would like it around 50 so she could do high-dose 50,000 units vitamin D once a week at nighttime for 6 months with 3 months given with 1 refill and then she could transition to low overdose of vitamin D after that 2000 units daily.  We will continue her omeprazole for heartburn and she will watch the food she eats and hopefully with losing weight and diet this will improve over time and we could then transition her to 20  mg of omeprazole in the future.  She could continue Flexeril as needed for the neck pain and try massage therapy and we will send her for physical therapy and she could also check the reviews and try a TENS unit for her neck and please also do the neck stretching and see the eye doctor for possible headaches as well to and will continue hydrochlorothiazide 25 mg daily for now with a desired blood pressure of 120s over 70s and if it starts getting higher lower she will let me know and we can adjust as needed.  Her allergies are stable and we will refill her Flonase and montelukast.  She will see me back and we will do her well woman exam and Pap at that visit and see how her blood pressures doing and her physical therapy is doing.  She will continue to work on her diet and weight and try for 5 to 10 pound weight loss per month but not to lose too much too quickly but to work on this slowly as well.    Return in about 6 weeks (around 9/11/2019), or blood pressure/physical therapy for neck, for Well Women Check with Pap.

## 2019-08-07 ENCOUNTER — SLEEP STUDY (OUTPATIENT)
Dept: SLEEP MEDICINE | Facility: MEDICAL CENTER | Age: 39
End: 2019-08-07
Attending: FAMILY MEDICINE
Payer: COMMERCIAL

## 2019-08-07 DIAGNOSIS — G47.30 SLEEP DISORDER BREATHING: ICD-10-CM

## 2019-08-07 PROCEDURE — 95810 POLYSOM 6/> YRS 4/> PARAM: CPT | Performed by: FAMILY MEDICINE

## 2019-08-08 NOTE — PROCEDURES
Technical summary:The patient underwent a diagnostic polysomnogram. This was a 16 channel montage study to include a 6 channel EEG, a 2 channel EOG, and chin EMG, left and right leg EMG, a snore channel, a nasal pressure transducer, and a nasal oral airflow thermistor.   Respiratory effort was assessed with the use of a thoracic and abdominal monitor and overnight oximetry was obtained. Audio and video recordings were reviewed. This was a fully attended study and sleep stage scoring was performed. The test was technically adequate.       Scoring Criteria: A modification of the the AASM Manual for the Scoring of Sleep and Associated Events, 2012, was used.   Obstructive apnea was scored by cessation of airflow for at least 10 seconds with continuing respiratory effort.  Central apnea was scored by cessation of airflow for at least 10 seconds with no effort.  Hypopnea was scored by a 30% or more reduction in airflow for at least 10 seconds accompanied by an arterial oxygen desaturation of 3% or more.  (For Medicare patients, hypopneas were scored by a 30% or more reduction in airflow for at least 10 seconds accompanied by an arterial oxygen saturation of 4% or more, as required by their insurance, CMS.    Interpretation:  Study start time was 10:27:31 PM.  Diagnostic recording time was 6h 32.0m with a total sleep time of 4h 49.5m resulting in a sleep efficiency of 73.85%%.  Sleep latency from the start of the study was 19 minutes and the latency from sleep to REM was 121 minutes.In total,86  arousals were scored for an arousal index of 17.8. Sleep stages showed decreased SE, increased WASO, decreased REM and increased arousal index of 17/hr.    Respiratory:  There were a total of 12 apneas consisting of 0 obstructive apneas, 0 mixed apneas, and 12 central apneas.  A total of 40 hypopneas were scored.The apnea index was 2.49 per hour and the hypopnea index was 8.29 per hour resulting in an overall AHI of 10.78.AHI  during rem was 38.4 and AHI while supine was 20.90.  23% of the apneas were central apneas.    Oximetry:  There was a mean oxygen saturation of 93.0% with a minimum oxygen saturation of 87.0%.  Time spent with oxygen saturations below 89% was 4.2 minutes.    Cardiac:  The highest heart rate seen while awake was 112 BPM while the highest heart rate during sleep was 96 BPM with an average sleeping heart rate of 83 BPM.    Limb Movements:  There were a total of 43 PLMs during sleep, of which 10 were PLMS arousals.  This resulted in a PLMS index of 8.9 and a PLMS arousal index of 2.1.     Impression:  1.  Mild OAS with AHI of 10.8/hr and O2 jeff 90 %  2.  Central apneas       Recommendations:  Recommend the patient return for a CPAP titration. In some cases alternative treatment options may prove effective in resolving sleep apnea and these options include upper airway surgery, the use of a dental orthotic or weight loss and positional therapy. Clinical correlation is required. In general patients with sleep apnea are advised to avoid alcohol and sedatives and to not operate a motor vehicle while drowsy and are at a greater risk for cardiovascular disease.

## 2019-09-09 ENCOUNTER — PHYSICAL THERAPY (OUTPATIENT)
Dept: PHYSICAL THERAPY | Facility: REHABILITATION | Age: 39
End: 2019-09-09
Attending: FAMILY MEDICINE
Payer: COMMERCIAL

## 2019-09-09 DIAGNOSIS — M54.2 NECK PAIN: ICD-10-CM

## 2019-09-09 PROCEDURE — 97161 PT EVAL LOW COMPLEX 20 MIN: CPT

## 2019-09-09 PROCEDURE — 97110 THERAPEUTIC EXERCISES: CPT

## 2019-09-09 SDOH — ECONOMIC STABILITY: GENERAL: QUALITY OF LIFE: GOOD

## 2019-09-09 ASSESSMENT — ENCOUNTER SYMPTOMS
PAIN SCALE: 5
PAIN SCALE AT LOWEST: 5
PAIN SCALE AT HIGHEST: 8

## 2019-09-09 NOTE — OP THERAPY EVALUATION
Outpatient Physical Therapy  INITIAL EVALUATION    [facility] [place]  [address], [address]  [facility] [address]  Phone:  [phone]  Fax:  [phone]    Date of Evaluation: 2019    Patient: Татьяна Velez  YOB: 1980  MRN: [id number]     Referring Provider: Chris Pradhan M.D.  [address] [facility] [facility] [place] [address]   Referring Diagnosis Neck pain [M54.2]     Time Calculation  Start time: 1600  Stop time: 1700 Time Calculation (min): 60 minutes       Physical Therapy Occurrence Codes    Date physical therapy care plan established or reviewed:  19   Date physical therapy treatment started:  19          Chief Complaint: No chief complaint on file.    Visit Diagnoses     ICD-10-CM   1. Neck pain M54.2         Subjective:   History of Present Illness:     Date of onset:  2017  Quality of life:  Good  Prior level of function:  Ongoing pain for years, states that HAs are getting worse over the last 15 years  Pain:     Current pain ratin    At best pain ratin    At worst pain ratin  Activities of Daily Living:     Patient reported ADL status: Limited with sleeping  Limited with end of day activities  Limited with lifting  Limited with rotation  Constant HAs  Patient Goals:     Patient goals for therapy:  Increased strength, decreased pain and increased motion    Patient is a 39 y.o. female that presents to therapy with neck pain and head aches. States that symptoms were due to injury, prior surgery but no trama. Reports the pain quality to be sharp/dull, constant and are primarily upper trap and neck paraspinal area. Reports that symptoms now worsening. States that aggravating factors are turning head, forward flexion, supine in pm.  States that easng factors are meds (migrain).  Denies red flag.    Past Medical History:   Diagnosis Date   • Bronchitis    • Chickenpox    • GERD (gastroesophageal reflux disease)    • Goiter 2019   •  Hypertension    • Melanoma (HCC)    • Migraine    • Mild depression (HCC) 3/19/2019   • Nasal drainage    • Tonsillitis    • Venereal disease      Past Surgical History:   Procedure Laterality Date   • SKIN RESURFACING      removal melanoma as kid     Social History     Tobacco Use   • Smoking status: Never Smoker   • Smokeless tobacco: Never Used   Substance Use Topics   • Alcohol use: No     Family and Occupational History     Socioeconomic History   • Marital status:      Spouse name: Not on file   • Number of children: Not on file   • Years of education: Not on file   • Highest education level: Not on file   Occupational History   • Not on file       Objective     Postural Observations  Seated posture: poor  Standing posture: poor  Correction of posture: has no consistent effect        Shoulder Screen    Shoulder range of motion within functional limits with the following exceptions: IR B slight increase in pain    Neurological Testing     Reflexes   Left   Biceps (C5/C6): normal (2+)  Triceps (C7): normal (2+)  Ankle clonus reflex: negative (1 beat)  Babinski sign: negative  Mcclendon's reflex: negative    Right   Biceps (C5/C6): normal (2+)  Triceps (C7): normal (2+)  Ankle clonus reflex: negative (1 beat)  Babinski sign: negative  Mcclendon's reflex: negative    Myotome testing   Cervical (left)   C5 (deltoid): 4+  C6 (biceps): 5  C7 (triceps): 5  C8 (thumb extension): 5  T1 (intrinsics): 5    Cervical (right)   C5 (deltoid): 4+  C6 (biceps): 5  C7 (triceps): 5  C8 (thumb extension): 5  T1 (intrinsics): 5    Dermatome testing   Cervical (left)   All left cervical dermatomes intact    Cervical (right)   All right cervical dermatomes intact    Additional Neurological Details  CN (-)  Gag not tested    Palpation   Left   Hypertonic in the cervical paraspinals and upper trapezius.   Hypotonic in the thoracic paraspinals.   Tenderness of the cervical paraspinals.     Right   Hypertonic in the cervical  paraspinals, thoracic paraspinals and upper trapezius.   Tenderness of the cervical paraspinals.     Active Range of Motion     Cervical Spine   Flexion: Active cervical flexion: 22deg.  Extension: Active cervical extension: 23deg.  Left lateral flexion: Active left cervical lateral flexion: 12deg.  Right lateral flexion: Active right cervical lateral flexion: 20deg.  Left rotation: Active left cervical rotation: 22deg.  Right rotation: Active right cervical rotation: 26deg.    Tests   Cervical spine   Positive cervical spine distraction.    Left Spine   Negative alar ligament integrity, Sharp-Camille test and vertebral artery test.     Right spine   Negative alar ligament integrity, Sharp-Camille test and vertebral artery test.     Boom Cervical Test     Sitting repeated motions:   Retraction in sitting     Symptoms during testing: produces    Symptoms after testing: no worse  Repeat retraction in sitting     Symptoms during testing: produces    Symptoms after testing: no worse        Therapeutic Exercises (CPT 65330):     1. Trial chin tuck, x10 every 3-4 hours    2. Postural edu, x5min      Time-based treatments/modalities:  Therapeutic exercise minutes (CPT 67521): 10 minutes       Assessment, Response and Plan:   Impairments: abnormal or restricted ROM, activity intolerance, limited mobility and pain with function    Assessment details:  Patient presents with signs and symptoms consistent with a cervical non-contractile dysfunction. Patient limitations include weakness, decreased ROM, and pain. Patient demonstrated a strong produce no worse response. Patient will benefit from skilled therapy to improve the aforementioned deficits and decrease further functional decline.   Barriers to therapy:  Out-of-pocket cost of treatment  Prognosis: fair    Goals:   Short Term Goals:   1) Patient's cervical flexion will improve by 10deg to facilitate use of phone.  2) Patient's cervical rotation L will improve by 10deg to  facilitate improved view of traffic.  Short term goal time span:  2-4 weeks      Long Term Goals:    1) Patient's symptoms will improve to allow for a rapid head turn of at least 45deg without an increase in symptoms.  2) Patient's NDI will improve by 10 to demonstrate functional improvement  Long term goal time span:  6-8 weeks    Plan:   Therapy options:  Physical therapy treatment to continue  Planned therapy interventions:  E Stim Unattended (CPT 87112), Hot or Cold Pack Therapy (CPT 19780), Manual Therapy (CPT 16519), Neuromuscular Re-education (CPT 55352), Therapeutic Exercise (CPT 36657) and Mechanical Traction (CPT 83763)  Frequency:  2x week  Duration in weeks:  8  Discussed with:  Patient      Functional Assessment Used  PT Functional Assessment Tool Used: NDI  PT Functional Assessment Score: 34     Referring provider co-signature:  I have reviewed this plan of care and my co-signature certifies the need for services.  Certification Dates:   From 09/09/19   To 11/4/19    Physician Signature: ________________________________ Date: ______________

## 2019-09-11 ENCOUNTER — PHYSICAL THERAPY (OUTPATIENT)
Dept: PHYSICAL THERAPY | Facility: REHABILITATION | Age: 39
End: 2019-09-11
Attending: FAMILY MEDICINE
Payer: COMMERCIAL

## 2019-09-11 DIAGNOSIS — M54.2 NECK PAIN: ICD-10-CM

## 2019-09-11 PROCEDURE — 97110 THERAPEUTIC EXERCISES: CPT

## 2019-09-11 NOTE — OP THERAPY DAILY TREATMENT
Outpatient Physical Therapy  DAILY TREATMENT     [facility] [place]  [address], [address]  [facility] [address]  Phone:  [phone]  Fax:  [phone]    Date: 09/11/2019    Patient: Татьяна Velez  YOB: 1980  MRN: [id number]     Time Calculation  Start time: 0730  Stop time: 0800 Time Calculation (min): 30 minutes       Chief Complaint: Neck Problem    Visit #: 2    SUBJECTIVE:  Notes a slight produce decrease response to chin tuck. Otherwise no change.     OBJECTIVE:  Current objective measures:   (+) oscar test B          Therapeutic Exercises (CPT 56110):     1. Foam roller progression pec stretch, [id number]    2. Chin tuck, x5    3. Foam roller supine dowel flexion, x10    4. Rows, x20    5. Pullbacks, x20    6. [place], x20    7. Hip flexor stretch supine/ 1/2 kneel, [id number]      Therapeutic Exercise Summary: Access Code: [email or web address]   URL: [email or web address]   Date: 09/11/2019   Prepared by: Jim Serra      Exercises  · Standing Shoulder Row - 10 reps - 3 sets - 2x daily - 7x weekly  · Shoulder Extension with Resistance - 10 reps - 3 sets - 2x daily - 7x weekly  · Supine Chin Tuck - 5 reps - 1 sets - 3x daily - 7x weekly  · Supine Chest Stretch on Foam Roll - 3 reps - 1 sets - 30sec hold - 3x daily - 7x weekly  · Overhead Reach on Foam Roll - 10 reps - 3 sets - 2x daily - 7x weekly  · Shoulder W - External Rotation with Resistance - 10 reps - 2 sets - 2x daily - 7x weekly  · Oscar Stretch on Table - 30 reps - 1 sets - 3x daily - 7x weekly          Time-based treatments/modalities:  Therapeutic exercise minutes (CPT 37974): 30 minutes       Pain rating before treatment: 4  Pain rating after treatment: 4    ASSESSMENT:   Response to treatment: Patient responded well to therapy with an overall produce no worse response. Noted increased hip flexor tightness.     PLAN/RECOMMENDATIONS:   Plan for treatment: therapy treatment to continue next visit.  Planned  interventions for next visit: continue with current treatment.

## 2019-09-17 ENCOUNTER — APPOINTMENT (OUTPATIENT)
Dept: PHYSICAL THERAPY | Facility: REHABILITATION | Age: 39
End: 2019-09-17
Attending: FAMILY MEDICINE
Payer: COMMERCIAL

## 2019-09-19 ENCOUNTER — PHYSICAL THERAPY (OUTPATIENT)
Dept: PHYSICAL THERAPY | Facility: REHABILITATION | Age: 39
End: 2019-09-19
Attending: FAMILY MEDICINE
Payer: COMMERCIAL

## 2019-09-19 ENCOUNTER — SLEEP CENTER VISIT (OUTPATIENT)
Dept: SLEEP MEDICINE | Facility: MEDICAL CENTER | Age: 39
End: 2019-09-19
Payer: COMMERCIAL

## 2019-09-19 VITALS
DIASTOLIC BLOOD PRESSURE: 74 MMHG | BODY MASS INDEX: 35.36 KG/M2 | SYSTOLIC BLOOD PRESSURE: 118 MMHG | OXYGEN SATURATION: 98 % | HEIGHT: 66 IN | HEART RATE: 93 BPM | WEIGHT: 220 LBS | RESPIRATION RATE: 15 BRPM

## 2019-09-19 DIAGNOSIS — M54.2 NECK PAIN: ICD-10-CM

## 2019-09-19 DIAGNOSIS — G47.33 OSA (OBSTRUCTIVE SLEEP APNEA): ICD-10-CM

## 2019-09-19 DIAGNOSIS — G25.81 RLS (RESTLESS LEGS SYNDROME): ICD-10-CM

## 2019-09-19 PROCEDURE — 97110 THERAPEUTIC EXERCISES: CPT

## 2019-09-19 PROCEDURE — 99213 OFFICE O/P EST LOW 20 MIN: CPT | Performed by: FAMILY MEDICINE

## 2019-09-19 PROCEDURE — 97140 MANUAL THERAPY 1/> REGIONS: CPT

## 2019-09-19 PROCEDURE — 97014 ELECTRIC STIMULATION THERAPY: CPT

## 2019-09-19 NOTE — OP THERAPY DAILY TREATMENT
Outpatient Physical Therapy  DAILY TREATMENT     [facility] [place]  [address], [address]  [facility] [address]  Phone:  [phone]  Fax:  [phone]    Date: 09/19/2019    Patient: Татьяна Velez  YOB: 1980  MRN: [id number]     Time Calculation  Start time: 1600  Stop time: 1645 Time Calculation (min): 45 minutes       Chief Complaint: Neck Problem    Visit #: 3    SUBJECTIVE:  Patient reports that HAs have increased but notes that she is more hormonal now and symptoms have increased like this in the past.     OBJECTIVE:  Current objective measures:   Poor hypomobile C2-C6 to PA          Therapeutic Exercises (CPT 41489):     5. Chin tuck, x20    6. Supine on foam roller pec stretch, [id number]    7. Ret with rotation, x20 L/R      Therapeutic Exercise Summary:         Therapeutic Treatments and Modalities:     1. Manual Therapy (CPT 19133), Rotatoional / PA mobes C2-C6 Grade I-III ; patient responded well with an increase in R rotation to 72deg    2. Manual Therapy (CPT 46709), TP trial to L levator scap; patient did not tolerate noted increased pain into base of skull and jaw area; patient educated that this may remain sore for a few days    3. E Stim Unattended (CPT 26024), IFC with HP x15min 80-150hz     Time-based treatments/modalities:  Manual therapy minutes (CPT 96484): 20 minutes  Therapeutic exercise minutes (CPT 85112): 10 minutes       Pain rating before treatment: 4  Pain rating after treatment: 3    ASSESSMENT:   Response to treatment: Patient responded fair to therapy. Noted a poor response to trigger point release and a positive response to mobilization. Plan to continue with mobilizations and decrease TP use.     PLAN/RECOMMENDATIONS:   Plan for treatment: therapy treatment to continue next visit.  Planned interventions for next visit: continue with current treatment.

## 2019-09-19 NOTE — PROGRESS NOTES
[facility] Follow Up Note     Date: 9/19/2019 / Time: 7:55 AM    Patient ID:   Name:             Татьяна Velez     YOB: 1980  Age:                 39 y.o.  female   MRN:               [id number]      Thank you for requesting a sleep medicine consultation on Татьяна Velez at the sleep center. She presents today with the chief complaints of SAMMY and PSg follow up.     HISTORY OF PRESENT ILLNESS:       Pt is currently not on therapy. She goes to sleep around  10 pm on weekdays and around 11 pm-12am on the weekends. She gets out of bed at 6:30 am on weekdays and at 6-10 am on the weekends.  She averages about 6-7 hrs of sleep on a good night and 4 hrs on a bad night. Overall,  She doesnot finds her sleep refreshing. She does not take regular naps.     PSG showed Mild OAS with AHI of 10.8/hr and O2 jeff 90 %.23% of the apneas were central apneas.     She has symptoms of restless legs syndrome As per pt it has been going since 2016 ian. Ferritin was checked on 7/16/19 which showed 12 ng/ml. She was started  Iron supplements and the RLS symptoms have resolved. Prior to iron supplement, It use to start around 9 pm. It rarely interfered with sleep onset but denies with sleep maintenance. She has FMH of RLS.         REVIEW OF SYSTEMS:       Constitutional: Denies fevers, Denies weight changes  Eyes: Denies changes in vision, no eye pain  Ears/Nose/Throat/Mouth: Denies nasal congestion or sore throat   Cardiovascular: Denies chest pain or palpitations   Respiratory: Denies shortness of breath , Denies cough  Gastrointestinal/Hepatic: Denies abdominal pain, nausea,  Musculoskeletal/Rheum: Denies  joint pain and swelling   Skin/Breast: Denies rash,   Neurological: Denies headache, confusion, memory loss or focal weakness/parasthesias  Psychiatric: denies mood disorder   Sleep: + EDS     Comprehensive review of systems form is reviewed with the patient and is attached in the EMR.     PMH:   has a past medical history of Bronchitis, Chickenpox, GERD (gastroesophageal reflux disease), Goiter (4/30/2019), Hypertension, Melanoma ([facility]), Migraine, Mild depression ([facility]) (3/19/2019), Nasal drainage, Tonsillitis, and Venereal disease. She also has no past medical history of Anemia, Anxiety, Arrhythmia, Asthma, Blood transfusion without reported diagnosis, CHF (congestive heart failure) ([facility]), Clotting disorder ([facility]), COPD (chronic obstructive pulmonary disease) ([facility]), Diabetes ([facility]), Heart attack ([facility]), Heart murmur, IBD (inflammatory bowel disease), Kidney disease, Seizure ([facility]), Stroke ([facility]), Substance abuse ([facility]), or Urinary tract infection.  MEDS:   Current Outpatient Medications:   •  montelukast (SINGULAIR) 10 MG Tab, Take 1 Tab by mouth every day., Disp: 30 Tab, Rfl: 3  •  fluticasone (FLONASE) 50 MCG/ACT nasal spray, Spray 1 Spray in nose every day., Disp: 3 Bottle, Rfl: 1  •  hydroCHLOROthiazide (HYDRODIURIL) 25 MG Tab, Take 1 Tab by mouth every day., Disp: 90 Tab, Rfl: 1  •  omeprazole (PRILOSEC) 40 MG delayed-release capsule, Take 1 Cap by mouth every day., Disp: 90 Cap, Rfl: 1  •  ergocalciferol (DRISDOL) 10856 UNIT capsule, Take one tab po once a week., Disp: 12 Cap, Rfl: 1  •  cyclobenzaprine (FLEXERIL) 5 MG tablet, Take 1-2 Tabs by mouth 3 times a day as needed., Disp: 60 Tab, Rfl: 1  •  PAZEO 0.7 % Solution, INSTILL 1 DROP INTO BOTH EYES EVERY MORNING AS DIRECTED, Disp: , Rfl: 6  ALLERGIES:   Allergies   Allergen Reactions   • Sulfa Drugs      SURGHX:   Past Surgical History:   Procedure Laterality Date   • SKIN RESURFACING      removal melanoma as kid     SOCHX:  reports that she has never smoked. She has never used smokeless tobacco. She reports that she does not drink alcohol or use drugs..  FH:   Family History   Problem Relation Age of Onset   • Heart Disease Maternal Grandmother    • Heart Disease Paternal Grandfather         heart attack   • Hypertension Mother    • Diabetes Mother    •  "Obesity Mother    • Hypertension Father    • Obesity Father    • Sleep Apnea Father    • Alcohol/Drug Maternal Grandfather    • Lung Disease Paternal Grandmother    • Heart Disease Paternal Grandmother         many heart attacks         Physical Exam:  Vitals/ General Appearance:   Weight/BMI: Body mass index is 35.51 kg/m².  /74 (BP Location: Right arm, Patient Position: Sitting, BP Cuff Size: Adult)   Pulse 93   Resp 15   Ht 1.676 m (5' 6\")   Wt 99.8 kg (220 lb)   SpO2 98%   Vitals:    09/19/19 0748   BP: 118/74   BP Location: Right arm   Patient Position: Sitting   BP Cuff Size: Adult   Pulse: 93   Resp: 15   SpO2: 98%   Weight: 99.8 kg (220 lb)   Height: 1.676 m (5' 6\")       Pt. is alert and oriented to time, place and person. Cooperative and in no apparent distress.       -Head: Atraumatic, normocephalic.   -. Ears: Normal tympanic membrane and no discharge  -. Nose: No inferior turbinate hypertophy, no septal deviation  -. Throat: Oropharynx appears crowded in that the palate is overhanging   -. Neck: Supple. No thyromegaly  -. Chest: Trachea central,   -. Lungs auscultation: B/L good air entry, vesicular breath sounds, no adventitious sounds  -. Heart auscultation: 1st and 2nd heart sounds normal, regular rhythm. No appreciable murmur.  - Extremities:  no pedal edema.  - Skin: No rash  - NEUROLOGICAL EXAMINATION: On neurological exam, the patient was alert and oriented x3. speech was clear and fluent without dysarthria.      ASSESSMENT AND PLAN     1. Sleep Apnea      The pathophysiology of sleep anea and the increased risk of cardiovascular morbidity from untreated sleep apnea is discussed in detail with the patient.      She is urged to avoid supine sleep, weight gain and alcoholic beverages since all of these can worsen sleep apnea. She is cautioned against drowsy driving. If She feels sleepy while driving, She must pull over for a break/nap, rather than persist on the road, in the interest of " She own safety and that of others on the road.   Plan  1.  - Auto CPAP vs overnight CPAP titration vs dental appliance and surgeries was dicussed in detail. After informed discussions he would like to try mandibular advancement device with Dr. May     - F/u in 8-10 weeks to assess the efficiacy of recommended pressure    - PSG was reviewed and discussed with the pt   - compliance was reinforced     2. Restless Leg syndrome: currently on supplemental iron. It does not interfere with sleep onset for sleep maintanance. Denies hx of anemia. RLS could be primary or secondary in nature. RLS can be exacerbated in light of sleep related breathing disorder.     Plan   - continue iron supplement, recheck ferritin in 3 months    - reassess after the treatment of SAMMY

## 2019-09-24 ENCOUNTER — PHYSICAL THERAPY (OUTPATIENT)
Dept: PHYSICAL THERAPY | Facility: REHABILITATION | Age: 39
End: 2019-09-24
Attending: FAMILY MEDICINE
Payer: COMMERCIAL

## 2019-09-24 DIAGNOSIS — M54.2 NECK PAIN: ICD-10-CM

## 2019-09-24 PROCEDURE — 97110 THERAPEUTIC EXERCISES: CPT

## 2019-09-24 PROCEDURE — 97140 MANUAL THERAPY 1/> REGIONS: CPT

## 2019-09-24 NOTE — OP THERAPY DAILY TREATMENT
Outpatient Physical Therapy  DAILY TREATMENT     [facility] [place]  [address], [address]  [place] [address]  Phone:  [phone]  Fax:  [phone]    Date: 09/24/2019    Patient: Татьяна Velez  YOB: 1980  MRN: [id number]     Time Calculation  Start time: 1600  Stop time: 1630 Time Calculation (min): 30 minutes       Chief Complaint: Neck Problem    Visit #: 4    SUBJECTIVE:  Patient reports that for 2 days she has significant relief. States that overall she felt better with a slight increase in HA.     OBJECTIVE:  Current objective measures:   R rot: 55deg  L rot: 30deg          Therapeutic Exercises (CPT 65435):     1. Scap ret / dep, x20    2. Deep cervical ret using stabilizer, x10 3 colors    Therapeutic Treatments and Modalities:     1. Manual Therapy (CPT 61411), Grade I-III L cervical rotation; STM to scar tissue; SO release; K tape for bio feedback posture    Time-based treatments/modalities:  Manual therapy minutes (CPT 60061): 20 minutes  Therapeutic exercise minutes (CPT 14425): 10 minutes       Pain rating before treatment: 4  Pain rating after treatment: 2    ASSESSMENT:   Response to treatment: Patient responded well to therapy with an overall decrease in symptoms. Symptom decrease does not last past 24 hours. Patient will continue with current program, however, participation in therapy maybe limited due to out of pocket costs.     PLAN/RECOMMENDATIONS:   Plan for treatment: therapy treatment to continue next visit.  Planned interventions for next visit: continue with current treatment.

## 2019-09-26 ENCOUNTER — PHYSICAL THERAPY (OUTPATIENT)
Dept: PHYSICAL THERAPY | Facility: REHABILITATION | Age: 39
End: 2019-09-26
Attending: FAMILY MEDICINE
Payer: COMMERCIAL

## 2019-09-26 DIAGNOSIS — M54.2 NECK PAIN: ICD-10-CM

## 2019-09-26 PROCEDURE — 97110 THERAPEUTIC EXERCISES: CPT

## 2019-09-26 PROCEDURE — 97140 MANUAL THERAPY 1/> REGIONS: CPT

## 2019-09-26 NOTE — OP THERAPY DAILY TREATMENT
Outpatient Physical Therapy  DAILY TREATMENT     [facility] [place]  [address], [address]  [place] [address]  Phone:  [phone]  Fax:  [phone]    Date: 09/26/2019    Patient: Татьяна Velez  YOB: 1980  MRN: [id number]     Time Calculation  Start time: 1600  Stop time: 1630 Time Calculation (min): 30 minutes       Chief Complaint: Neck Problem    Visit #: 5    SUBJECTIVE:  Patient reports increased head aches. Notes that overall her motion has been better but no change in pain.     OBJECTIVE:  Current objective measures:  Pre MT   39deg L rot  53deg R rot    Post MT  61deg L rot  60deg R rot          Therapeutic Exercises (CPT 60549):     1. Scap ret / dep, x20    2. Occipital float, x20 rot    Therapeutic Treatments and Modalities:     1. Manual Therapy (CPT 76629), Grade I-III L cervical rotation; STM to scar tissue; education to father of grade I-II rotational mobs from c4 down to C7; demonstrated understanding    Time-based treatments/modalities:  Manual therapy minutes (CPT 10959): 20 minutes  Therapeutic exercise minutes (CPT 69739): 10 minutes       Pain rating before treatment: 4  Pain rating after treatment: 4    ASSESSMENT:   Response to treatment: Due to out of pocket restrictions patient will now trial a more home based program. Patient to follow up via phone for next week. Plan to see patient 1x every few weeks.     PLAN/RECOMMENDATIONS:   Plan for treatment: therapy treatment to continue next visit.  Planned interventions for next visit: continue with current treatment.

## 2019-10-01 ENCOUNTER — APPOINTMENT (OUTPATIENT)
Dept: PHYSICAL THERAPY | Facility: REHABILITATION | Age: 39
End: 2019-10-01
Attending: FAMILY MEDICINE
Payer: COMMERCIAL

## 2019-10-03 ENCOUNTER — APPOINTMENT (OUTPATIENT)
Dept: PHYSICAL THERAPY | Facility: REHABILITATION | Age: 39
End: 2019-10-03
Attending: FAMILY MEDICINE
Payer: COMMERCIAL

## 2019-10-05 ENCOUNTER — OFFICE VISIT (OUTPATIENT)
Dept: URGENT CARE | Facility: PHYSICIAN GROUP | Age: 39
End: 2019-10-05
Payer: COMMERCIAL

## 2019-10-05 VITALS
HEIGHT: 66 IN | BODY MASS INDEX: 35.36 KG/M2 | HEART RATE: 84 BPM | SYSTOLIC BLOOD PRESSURE: 142 MMHG | TEMPERATURE: 98.1 F | OXYGEN SATURATION: 99 % | RESPIRATION RATE: 15 BRPM | WEIGHT: 220 LBS | DIASTOLIC BLOOD PRESSURE: 102 MMHG

## 2019-10-05 DIAGNOSIS — J01.11 ACUTE RECURRENT FRONTAL SINUSITIS: ICD-10-CM

## 2019-10-05 PROCEDURE — 99214 OFFICE O/P EST MOD 30 MIN: CPT | Performed by: FAMILY MEDICINE

## 2019-10-05 RX ORDER — GUAIFENESIN 600 MG/1
600 TABLET, EXTENDED RELEASE ORAL EVERY 12 HOURS
COMMUNITY
End: 2019-10-08

## 2019-10-05 RX ORDER — AMOXICILLIN AND CLAVULANATE POTASSIUM 875; 125 MG/1; MG/1
1 TABLET, FILM COATED ORAL 2 TIMES DAILY
Qty: 14 TAB | Refills: 0 | Status: SHIPPED | OUTPATIENT
Start: 2019-10-05 | End: 2019-10-12

## 2019-10-05 ASSESSMENT — ENCOUNTER SYMPTOMS
NAUSEA: 0
WEIGHT LOSS: 0
EYE REDNESS: 0
MYALGIAS: 0
VOMITING: 0
EYE DISCHARGE: 0

## 2019-10-05 NOTE — PROGRESS NOTES
"Subjective:      Татьяна Velez is a 39 y.o. female who presents with Sinus Pain (bad allergies x 3 weeks, cough and cold sx now having sinus pain)            3 weeks progressively worse sinus pressure and drainage. Productive cough without blood in sputum. No fever. +PMH sinusitis/no sinus surgery. Intermittent SOB/wheeze. No PMH asthma/pneumonia. Initial relief with OTC medications/no relief last night. No other aggravating or alleviating factors.        Review of Systems   Constitutional: Positive for malaise/fatigue. Negative for weight loss.   Eyes: Negative for discharge and redness.   Gastrointestinal: Negative for nausea and vomiting.   Musculoskeletal: Negative for joint pain and myalgias.   Skin: Negative for itching and rash.   .  Medications, Allergies, and current problem list reviewed today in Epic         Objective:     /102   Pulse 84   Temp 36.7 °C (98.1 °F)   Resp 15   Ht 1.676 m (5' 6\")   Wt 99.8 kg (220 lb)   SpO2 99%   BMI 35.51 kg/m²      Physical Exam   Constitutional: She is oriented to person, place, and time. She appears well-developed and well-nourished. No distress.   HENT:   Head: Normocephalic and atraumatic.   Mouth/Throat: Oropharynx is clear and moist.   Tender frontal sinus bilateral, +PND   Eyes: Conjunctivae are normal.   Cardiovascular: Normal rate, regular rhythm and normal heart sounds.   No murmur heard.  Pulmonary/Chest: Effort normal and breath sounds normal. She has no wheezes.   Neurological: She is alert and oriented to person, place, and time.   Skin: Skin is warm and dry. No rash noted.               Assessment/Plan:     1. Acute recurrent frontal sinusitis  amoxicillin-clavulanate (AUGMENTIN) 875-125 MG Tab     Differential diagnosis, natural history, supportive care, and indications for immediate follow-up discussed at length.     Nasal saline, decongestant, nasal Cs.     "

## 2019-10-08 ENCOUNTER — OFFICE VISIT (OUTPATIENT)
Dept: MEDICAL GROUP | Facility: PHYSICIAN GROUP | Age: 39
End: 2019-10-08
Payer: COMMERCIAL

## 2019-10-08 ENCOUNTER — HOSPITAL ENCOUNTER (OUTPATIENT)
Facility: MEDICAL CENTER | Age: 39
End: 2019-10-08
Attending: FAMILY MEDICINE
Payer: COMMERCIAL

## 2019-10-08 ENCOUNTER — PHYSICAL THERAPY (OUTPATIENT)
Dept: PHYSICAL THERAPY | Facility: REHABILITATION | Age: 39
End: 2019-10-08
Attending: FAMILY MEDICINE
Payer: COMMERCIAL

## 2019-10-08 VITALS
OXYGEN SATURATION: 98 % | HEIGHT: 66 IN | RESPIRATION RATE: 14 BRPM | DIASTOLIC BLOOD PRESSURE: 86 MMHG | HEART RATE: 89 BPM | SYSTOLIC BLOOD PRESSURE: 122 MMHG | WEIGHT: 218 LBS | TEMPERATURE: 98.1 F | BODY MASS INDEX: 35.03 KG/M2

## 2019-10-08 DIAGNOSIS — J30.1 SEASONAL ALLERGIC RHINITIS DUE TO POLLEN: ICD-10-CM

## 2019-10-08 DIAGNOSIS — M54.2 NECK PAIN: ICD-10-CM

## 2019-10-08 DIAGNOSIS — E61.1 IRON DEFICIENCY: ICD-10-CM

## 2019-10-08 DIAGNOSIS — Z01.419 WELL FEMALE EXAM WITH ROUTINE GYNECOLOGICAL EXAM: ICD-10-CM

## 2019-10-08 DIAGNOSIS — E78.5 DYSLIPIDEMIA: ICD-10-CM

## 2019-10-08 DIAGNOSIS — G47.33 OSA (OBSTRUCTIVE SLEEP APNEA): ICD-10-CM

## 2019-10-08 DIAGNOSIS — Z12.4 SCREENING FOR CERVICAL CANCER: ICD-10-CM

## 2019-10-08 DIAGNOSIS — K21.9 GASTROESOPHAGEAL REFLUX DISEASE WITHOUT ESOPHAGITIS: ICD-10-CM

## 2019-10-08 DIAGNOSIS — E53.8 VITAMIN B12 DEFICIENCY: ICD-10-CM

## 2019-10-08 DIAGNOSIS — G25.81 RLS (RESTLESS LEGS SYNDROME): ICD-10-CM

## 2019-10-08 DIAGNOSIS — Z11.51 SCREENING FOR HPV (HUMAN PAPILLOMAVIRUS): ICD-10-CM

## 2019-10-08 DIAGNOSIS — E55.9 VITAMIN D DEFICIENCY: ICD-10-CM

## 2019-10-08 DIAGNOSIS — I10 ESSENTIAL HYPERTENSION: ICD-10-CM

## 2019-10-08 DIAGNOSIS — Z23 NEED FOR VACCINATION: ICD-10-CM

## 2019-10-08 DIAGNOSIS — Z51.81 MEDICATION MONITORING ENCOUNTER: ICD-10-CM

## 2019-10-08 PROBLEM — E66.9 OBESITY (BMI 30-39.9): Status: RESOLVED | Noted: 2019-07-16 | Resolved: 2019-10-08

## 2019-10-08 PROCEDURE — 88175 CYTOPATH C/V AUTO FLUID REDO: CPT

## 2019-10-08 PROCEDURE — 87624 HPV HI-RISK TYP POOLED RSLT: CPT

## 2019-10-08 PROCEDURE — 90471 IMMUNIZATION ADMIN: CPT | Performed by: FAMILY MEDICINE

## 2019-10-08 PROCEDURE — 99395 PREV VISIT EST AGE 18-39: CPT | Mod: 25 | Performed by: FAMILY MEDICINE

## 2019-10-08 PROCEDURE — 90651 9VHPV VACCINE 2/3 DOSE IM: CPT | Performed by: FAMILY MEDICINE

## 2019-10-08 PROCEDURE — 97140 MANUAL THERAPY 1/> REGIONS: CPT

## 2019-10-08 PROCEDURE — 97110 THERAPEUTIC EXERCISES: CPT

## 2019-10-08 RX ORDER — CHOLECALCIFEROL (VITAMIN D3) 125 MCG
500 CAPSULE ORAL DAILY
COMMUNITY
End: 2021-05-11

## 2019-10-08 RX ORDER — FLUTICASONE PROPIONATE 50 MCG
1 SPRAY, SUSPENSION (ML) NASAL DAILY
Qty: 3 BOTTLE | Refills: 1 | Status: SHIPPED | OUTPATIENT
Start: 2019-10-08 | End: 2020-01-06 | Stop reason: SDUPTHER

## 2019-10-08 RX ORDER — MONTELUKAST SODIUM 10 MG/1
10 TABLET ORAL DAILY
Qty: 90 TAB | Refills: 2 | Status: SHIPPED | OUTPATIENT
Start: 2019-10-08 | End: 2020-01-06 | Stop reason: SDUPTHER

## 2019-10-08 RX ORDER — HYDROCHLOROTHIAZIDE 25 MG/1
25 TABLET ORAL
Qty: 90 TAB | Refills: 1 | Status: SHIPPED | OUTPATIENT
Start: 2019-10-08 | End: 2020-01-06 | Stop reason: SDUPTHER

## 2019-10-08 RX ORDER — FERROUS SULFATE 325(65) MG
325 TABLET ORAL DAILY
COMMUNITY
End: 2021-11-11

## 2019-10-08 RX ORDER — OMEPRAZOLE 20 MG/1
20 CAPSULE, DELAYED RELEASE ORAL DAILY
Qty: 90 CAP | Refills: 0 | Status: SHIPPED | OUTPATIENT
Start: 2019-10-08 | End: 2020-01-06 | Stop reason: SDUPTHER

## 2019-10-08 RX ORDER — ZOLPIDEM TARTRATE 5 MG/1
1 TABLET ORAL
Refills: 0 | COMMUNITY
Start: 2019-08-06 | End: 2019-10-08

## 2019-10-08 NOTE — OP THERAPY DAILY TREATMENT
Outpatient Physical Therapy  DAILY TREATMENT     [facility] Out[facility] Therapy [place]  [address], [address]  [place] [address]  Phone:  [phone]  Fax:  [phone]    Date: 10/08/2019    Patient: Татьяна Velez  YOB: 1980  MRN: [id number]     Time Calculation  Start time: 1600  Stop time: 1630 Time Calculation (min): 30 minutes       Chief Complaint: Neck Problem    Visit #: 6    SUBJECTIVE:  Patient reports that her R rotation is still near pain free and L rotation is improving.     OBJECTIVE:  Current objective measures:   Cervical Rot:  R: 60deg  L: 52deg    R: 63deg  L: 60deg          Therapeutic Exercises (CPT 57105):     1. Scap ret / dep, x20    2. Occipital float, x20 rot    3. Shoulder angles, x20    4. Supine chin tuck with ret off table, x5 3x a day    5. UT stretch, [id number]    6. Edu re: use of SO release pillow    Therapeutic Treatments and Modalities:     1. Manual Therapy (CPT 09684), Grade I-III L cervical rotation; STM to scar tissue; grade I-II rotational mobs from c4 down to C7;    Time-based treatments/modalities:  Manual therapy minutes (CPT 01808): 10 minutes  Therapeutic exercise minutes (CPT 80139): 20 minutes       Pain rating before treatment: 2  Pain rating after treatment: 1    ASSESSMENT:   Response to treatment: Patient is responding well to therapy. Patient ROM is improving and she is noted less pain. Plan to continue with current POC.     PLAN/RECOMMENDATIONS:   Plan for treatment: therapy treatment to continue next visit.  Planned interventions for next visit: continue with current treatment.

## 2019-10-09 NOTE — PATIENT INSTRUCTIONS
Diagnoses and all orders for this visit:    Well female exam with routine gynecological exam  -     THINPREP PAP WITH HPV; Future    SAMMY (obstructive sleep apnea)    Screening for cervical cancer  -     THINPREP PAP WITH HPV; Future    Screening for HPV (human papillomavirus)  -     THINPREP PAP WITH HPV; Future    RLS (restless legs syndrome)    Gastroesophageal reflux disease without esophagitis  -     omeprazole (PRILOSEC) 20 MG delayed-release capsule; Take 1 Cap by mouth every day.    Essential hypertension  -     hydroCHLOROthiazide (HYDRODIURIL) 25 MG Tab; Take 1 Tab by mouth every day.  -     Comp Metabolic Panel; Future    Vitamin D deficiency    Vitamin B12 deficiency    Seasonal allergic rhinitis due to pollen  -     montelukast (SINGULAIR) 10 MG Tab; Take 1 Tab by mouth every day.  -     fluticasone (FLONASE) 50 MCG/ACT nasal spray; Spray 1 Spray in nose every day.    Neck pain    BMI 35.0-35.9,adult    Need for vaccination  -     9VHPV Vaccine 2-3 Dose IM    Medication monitoring encounter  -     CBC WITH DIFFERENTIAL; Future  -     Comp Metabolic Panel; Future    Dyslipidemia  -     Lipid Profile; Future    Iron deficiency  -     CBC WITH DIFFERENTIAL; Future  -     FERRITIN; Future    -Well woman exam done today with HPV and Pap and will call with the results and no breast issues and breast and pelvic exam within normal limits and no need for any STD screening.  HPV vaccine #1 of the 3 dose given and we will give the next one at her follow-up in January at least 1 week before you see me please get fasting lab work 8 hours of no eating but drink plenty of water and we will get her CBC, CMP, ferritin and lipid and continue to take iron once daily and vitamin B-12 500 mcg daily and vitamin D 50,000 units once weekly and once you are done that you could do the over-the-counter 2000 units daily she will continue to see physical therapy for her chronic neck pain and we will see how that is going and her  dentist for her sleep apnea for the device and intervention she is doing and we will see how that is going.  Her blood pressure stable we wanted to be 120s over 70s but if it is creeping higher than 130s over 90s or if it is getting too low then she will let me know.  Since she is on hydrochlorothiazide we will recheck her CMP and make sure potassium and everything is going well.  She is stable on montelukast and Flonase for her allergies so we have refilled this as well to for the omeprazole or heartburn once he finished the 40 mg daily please reduce down to 20 mg of omeprazole and if you are getting any heartburn flareups you could do some Tums but if it is having too much heartburn flareups please let us know and please avoid the food that does cause the heartburn and having more than 1 cup of caffeine or alcohol as that could increase your heartburn.  She will continue to work on diet and exercise and weight control and her last A1c is 5.6 so we will hold off and do this later on.  Otherwise she is doing really well and if she is having any other additional concerns she will come and make an appointment and see me earlier otherwise we will see her back in 3 months to get her HPV vaccine #2 and to go over her lab work and make sure her blood pressure still doing well.    Return in about 3 months (around 1/8/2020), or blood pressure/lab FU/HPV #2.

## 2019-10-09 NOTE — PROGRESS NOTES
cc: Well women exam with Pap, HPV screen, hypertension, HPV vaccine    Subjective:     Татьяна Velez is a 39 y.o. female presenting for her well woman exam.  Her LMP is Patient's last menstrual period was 09/24/2019 (approximate)..  Her previous Paps have been she did have HPV positive about 13 years ago and it did become negative after.  She has not had any cervical biopsies   family history of any reproductive cancers none.  History of STD none, yeast none, bacterial vaginosis none, urinary tract infections none.  She did go for the sleep study and she does have central sleep apnea and before doing any CPAP they want to try with the dentist and she did go for in-depth appointment with her dentist where they did various tests and they are going to see if she is being fit for device.  She is also doing the physical therapy for the neck pain and this is been helping as well to.  She is still doing the montelukast and Flonase and her seasonal allergies are doing better.  Her GERD has been doing better on omeprazole 40 mg with no breakthrough symptoms.  Her blood pressure today is 120s over 80s and has been stable except when she went to the dentist but otherwise is been stable and she is taking her hydrochlorothiazide 25 mg daily.  She is taking her iron once a day since her ferritin was low and since she is been doing this her restless leg syndrome has improved.  She is doing high-dose vitamin D once a week and she still doing the vitamin B12 and B6.  She is never had a mammogram or ultrasound of her breast.  She does not have any cosmetic breast implants or any surgeries to her breast.  No breast pain or issues.  No dysuria or hematuria today.  No pelvic pain or constipation.  She is in a monogamous relationship but not currently having any intercourse as her  right now has some medical issues.  She does not douche.  She is not on any birth control.  She was on birth control in the past due to  menorrhagia but then she was getting migraines so they took her off of this and then she was on IUD.  She has not had any pregnancies and not planning on any pregnancy anytime soon.  Her menstrual cycles are still regular but the 1st-3 days she does have heavy periods and she has started her iron once daily.  No painful intercourse.  No herpes or skin lesions in the genital region.  No vaginal discharge or itching today.  She does not need any STD screening done and no concerns for STD.  She has not had the HPV vaccine.     Review of systems:     Constitutional: Negative for fever, chills and negative fatigue.   HENT: Negative for sinus pressure  Eyes: Negative for blurriness  Respiratory: Negative for cough and shortness of breath, negative for exertional shortness of breath  Cardiovascular: Negative for leg swelling, negative for palpitations, negative for chest pain  Gastrointestinal: Negative for nausea, vomiting, abdominal pain, constipation and diarrhea.  Genitourinary: Negative for dysuria and hematuria.   Neurological: Negative for dizziness, focal weakness and headaches.   Endo/Heme/Allergies: Denies bleeding, bruising, and recurrent allergies.        Current Outpatient Medications:   •  ferrous sulfate 325 (65 Fe) MG tablet, Take 325 mg by mouth every day., Disp: , Rfl:   •  cyanocobalamin (VITAMIN B-12) 500 MCG Tab, Take 500 mcg by mouth every day., Disp: , Rfl:   •  omeprazole (PRILOSEC) 20 MG delayed-release capsule, Take 1 Cap by mouth every day., Disp: 90 Cap, Rfl: 0  •  hydroCHLOROthiazide (HYDRODIURIL) 25 MG Tab, Take 1 Tab by mouth every day., Disp: 90 Tab, Rfl: 1  •  montelukast (SINGULAIR) 10 MG Tab, Take 1 Tab by mouth every day., Disp: 90 Tab, Rfl: 2  •  fluticasone (FLONASE) 50 MCG/ACT nasal spray, Spray 1 Spray in nose every day., Disp: 3 Bottle, Rfl: 1  •  amoxicillin-clavulanate (AUGMENTIN) 875-125 MG Tab, Take 1 Tab by mouth 2 times a day for 7 days., Disp: 14 Tab, Rfl: 0  •   "ergocalciferol (DRISDOL) 71889 UNIT capsule, Take one tab po once a week., Disp: 12 Cap, Rfl: 1  •  PAZEO 0.7 % Solution, INSTILL 1 DROP INTO BOTH EYES EVERY MORNING AS DIRECTED, Disp: , Rfl: 6    Allergies, past medical history, past surgical history, family history, social history reviewed and updated    Objective:     Vitals: /86 (BP Location: Right arm, Patient Position: Sitting, BP Cuff Size: Adult)   Pulse 89   Temp 36.7 °C (98.1 °F) (Temporal)   Resp 14   Ht 1.676 m (5' 6\")   Wt 98.9 kg (218 lb)   LMP 09/24/2019 (Approximate)   SpO2 98%   BMI 35.19 kg/m²   General: Alert, pleasant, NAD  HEENT: Normocephalic.  Nontraumatic. EOMI, no icterus or pallor.  Conjunctivae and lids normal. External ears normal.   Heart: Regular rate and rhythm.  S1 and S2 normal.  No murmurs appreciated.  Respiratory: Normal respiratory effort.  Clear to auscultation bilaterally.  Abdomen: Non-distended, soft, non tender in all 4 quadrants.  Skin: Warm, dry, no rashes.  Musculoskeletal: Gait is normal.  Moves all extremities well.  Extremities: No leg edema.  Psych:  Affect/mood is normal, judgement is good, memory is intact, grooming is appropriate.   examined with chaperone medical assistant Rosa Perera and bilateral breast exam done within normal limits with no masses or lumps felt with the bilateral breasts and bilateral axilla on palpation and no nipple discharge and cervical exam with labia exam with no lesions or masses felt and physiological vaginal discharge only and cervix visualized with minimal cervical friability and no cervical motion tenderness, no lesions or discharge from cervix and on pelvic examination and palpation, normal positioning of ovaries and no masses felt and no leakage on urine on coughing.  Pap test was done with the brush and spatula.      Assessment/Plan:     Diagnoses and all orders for this visit:    Well female exam with routine gynecological exam  -     THINPREP PAP WITH HPV; " Future    SAMMY (obstructive sleep apnea)    Screening for cervical cancer  -     THINPREP PAP WITH HPV; Future    Screening for HPV (human papillomavirus)  -     THINPREP PAP WITH HPV; Future    RLS (restless legs syndrome)    Gastroesophageal reflux disease without esophagitis  -     omeprazole (PRILOSEC) 20 MG delayed-release capsule; Take 1 Cap by mouth every day.    Essential hypertension  -     hydroCHLOROthiazide (HYDRODIURIL) 25 MG Tab; Take 1 Tab by mouth every day.  -     Comp Metabolic Panel; Future    Vitamin D deficiency    Vitamin B12 deficiency    Seasonal allergic rhinitis due to pollen  -     montelukast (SINGULAIR) 10 MG Tab; Take 1 Tab by mouth every day.  -     fluticasone (FLONASE) 50 MCG/ACT nasal spray; Spray 1 Spray in nose every day.    Neck pain    BMI 35.0-35.9,adult    Need for vaccination  -     9VHPV Vaccine 2-3 Dose IM    Medication monitoring encounter  -     CBC WITH DIFFERENTIAL; Future  -     Comp Metabolic Panel; Future    Dyslipidemia  -     Lipid Profile; Future    Iron deficiency  -     CBC WITH DIFFERENTIAL; Future  -     FERRITIN; Future    -Well woman exam done today with HPV and Pap and will call with the results and no breast issues and breast and pelvic exam within normal limits and no need for any STD screening.  HPV vaccine #1 of the 3 dose given and we will give the next one at her follow-up in January at least 1 week before you see me please get fasting lab work 8 hours of no eating but drink plenty of water and we will get her CBC, CMP, ferritin and lipid and continue to take iron once daily and vitamin B-12 500 mcg daily and vitamin D 50,000 units once weekly and once you are done that you could do the over-the-counter 2000 units daily she will continue to see physical therapy for her chronic neck pain and we will see how that is going and her dentist for her sleep apnea for the device and intervention she is doing and we will see how that is going.  Her blood  pressure stable we wanted to be 120s over 70s but if it is creeping higher than 130s over 90s or if it is getting too low then she will let me know.  Since she is on hydrochlorothiazide we will recheck her CMP and make sure potassium and everything is going well.  She is stable on montelukast and Flonase for her allergies so we have refilled this as well to for the omeprazole or heartburn once he finished the 40 mg daily please reduce down to 20 mg of omeprazole and if you are getting any heartburn flareups you could do some Tums but if it is having too much heartburn flareups please let us know and please avoid the food that does cause the heartburn and having more than 1 cup of caffeine or alcohol as that could increase your heartburn.  She will continue to work on diet and exercise and weight control and her last A1c is 5.6 so we will hold off and do this later on.  Otherwise she is doing really well and if she is having any other additional concerns she will come and make an appointment and see me earlier otherwise we will see her back in 3 months to get her HPV vaccine #2 and to go over her lab work and make sure her blood pressure still doing well.    Return in about 3 months (around 1/8/2020), or blood pressure/lab FU/HPV #2.

## 2019-10-09 NOTE — PROGRESS NOTES
cc: ***    Subjective:     Татьяна Velez is a 39 y.o. female presenting ***      Review of systems:     Constitutional: Negative for fever, chills and *** fatigue.   HENT: Negative for sinus pressure, negative for ear pain or hearing loss  Eyes: Negative for blurriness, negative for double vision  Respiratory: Negative for cough and shortness of breath, negative for exertional shortness of breath  Cardiovascular: Negative for leg swelling, negative for palpitations, negative for chest pain  Gastrointestinal: Negative for nausea, vomiting, abdominal pain, constipation and diarrhea.  Genitourinary: Negative for dysuria and hematuria.   Skin: Negative for rash.   Neurological: Negative for dizziness, focal weakness and headaches.   Endo/Heme/Allergies: Denies bleeding, bruising, and recurrent allergies.  Psychiatric/Behavioral: Negative for depression and anxiety.        Current Outpatient Medications:   •  guaiFENesin LA (MUCINEX) 600 MG TABLET SR 12 HR, Take 600 mg by mouth every 12 hours., Disp: , Rfl:   •  amoxicillin-clavulanate (AUGMENTIN) 875-125 MG Tab, Take 1 Tab by mouth 2 times a day for 7 days., Disp: 14 Tab, Rfl: 0  •  montelukast (SINGULAIR) 10 MG Tab, Take 1 Tab by mouth every day., Disp: 30 Tab, Rfl: 3  •  fluticasone (FLONASE) 50 MCG/ACT nasal spray, Spray 1 Spray in nose every day., Disp: 3 Bottle, Rfl: 1  •  hydroCHLOROthiazide (HYDRODIURIL) 25 MG Tab, Take 1 Tab by mouth every day., Disp: 90 Tab, Rfl: 1  •  omeprazole (PRILOSEC) 40 MG delayed-release capsule, Take 1 Cap by mouth every day., Disp: 90 Cap, Rfl: 1  •  ergocalciferol (DRISDOL) 12503 UNIT capsule, Take one tab po once a week., Disp: 12 Cap, Rfl: 1  •  PAZEO 0.7 % Solution, INSTILL 1 DROP INTO BOTH EYES EVERY MORNING AS DIRECTED, Disp: , Rfl: 6  •  zolpidem (AMBIEN) 5 MG Tab, Take 1 Tab by mouth every bedtime., Disp: , Rfl: 0  •  cyclobenzaprine (FLEXERIL) 5 MG tablet, Take 1-2 Tabs by mouth 3 times a day as needed. (Patient  "not taking: Reported on 10/5/2019), Disp: 60 Tab, Rfl: 1    Allergies, past medical history, past surgical history, family history, social history reviewed and updated    Objective:     Vitals: /86 (BP Location: Right arm, Patient Position: Sitting, BP Cuff Size: Adult)   Pulse 89   Temp 36.7 °C (98.1 °F) (Temporal)   Resp 14   Ht 1.676 m (5' 6\")   Wt 98.9 kg (218 lb)   LMP 09/24/2019 (Approximate)   SpO2 98%   BMI 35.19 kg/m²   General: Alert, pleasant, NAD  HEENT: Normocephalic.  Nontraumatic. EOMI, no icterus or pallor.  Conjunctivae and lids normal. External ears normal. Oropharynx non-erythematous, mucous membranes moist.  Neck supple.  No thyromegaly or masses palpated. No cervical or supraclavicular lymphadenopathy.  Heart: Regular rate and rhythm.  S1 and S2 normal.  No murmurs appreciated.  Respiratory: Normal respiratory effort.  Clear to auscultation bilaterally.  Abdomen: Non-distended, soft, non tender in all 4 quadrants.  Skin: Warm, dry, no rashes.  Musculoskeletal: Gait is normal.  Moves all extremities well.  Extremities: No leg edema.  Pedal pulses 2+ symmetric.   Psych:  Affect/mood is normal, judgement is good, memory is intact, grooming is appropriate.    Assessment/Plan:     There are no diagnoses linked to this encounter.    No follow-ups on file.    Patient was seen for *** minutes face-to-face of which greater than 50% of the visit was spent in counseling and coordination of care as documented above in their assessment and plan.       "

## 2019-10-10 ENCOUNTER — APPOINTMENT (OUTPATIENT)
Dept: PHYSICAL THERAPY | Facility: REHABILITATION | Age: 39
End: 2019-10-10
Attending: FAMILY MEDICINE
Payer: COMMERCIAL

## 2019-10-10 LAB
CYTOLOGY REG CYTOL: NORMAL
HPV HR 12 DNA CVX QL NAA+PROBE: NEGATIVE
HPV16 DNA SPEC QL NAA+PROBE: NEGATIVE
HPV18 DNA SPEC QL NAA+PROBE: NEGATIVE
SPECIMEN SOURCE: NORMAL

## 2019-10-14 ENCOUNTER — TELEPHONE (OUTPATIENT)
Dept: MEDICAL GROUP | Facility: PHYSICIAN GROUP | Age: 39
End: 2019-10-14

## 2019-10-14 NOTE — TELEPHONE ENCOUNTER
----- Message from Chris Pradhan M.D. sent at 10/12/2019  2:23 PM PDT -----  Please call patient and let patient know Pap test is within normal limits and HPV is negative and so I recommend to get the screening again of cotesting with HPV and Pap in 3 years unless she has ever had abnormal Pap in the past, she changes sexual partner, she has any vaginal concerns or issues, or if she would like to by choice then she can repeat this in 1 year, thank you.

## 2019-10-14 NOTE — TELEPHONE ENCOUNTER
Phone Number Called: [phone] (home)     Call outcome: Left vm for Pt to call office back or check mychart.

## 2019-10-14 NOTE — LETTER
October 16, 2019            Татьяна Velez      We have been trying to reach to go over your recent lab results. Please call the office at [phone] or check [facility] for your results.      Thank you,        Rosa Perera, Med Ass't

## 2019-10-17 ENCOUNTER — PHYSICAL THERAPY (OUTPATIENT)
Dept: PHYSICAL THERAPY | Facility: REHABILITATION | Age: 39
End: 2019-10-17
Attending: FAMILY MEDICINE
Payer: COMMERCIAL

## 2019-10-17 DIAGNOSIS — M54.2 NECK PAIN: ICD-10-CM

## 2019-10-17 PROCEDURE — 97110 THERAPEUTIC EXERCISES: CPT

## 2019-10-17 PROCEDURE — 97140 MANUAL THERAPY 1/> REGIONS: CPT

## 2019-10-17 NOTE — OP THERAPY DAILY TREATMENT
Outpatient Physical Therapy  DAILY TREATMENT     [facility] [place]  [address], [address]  [place] [address]  Phone:  [phone]  Fax:  [phone]    Date: 10/17/2019    Patient: Татьяна Velez  YOB: 1980  MRN: [id number]     Time Calculation  Start time: 1628  Stop time: 1700 Time Calculation (min): 32 minutes       Chief Complaint: Neck Problem    Visit #: 7    SUBJECTIVE:  Patient reports that symptoms are stable / decreasing. Notes that overall she is feeling better.     OBJECTIVE:  Current objective measures:   Cervical rotation: L: 55deg  R: 63deg          Therapeutic Exercises (CPT 88355):     1. Scap ret / dep, x20    2. Occipital float, x20 rot    3. Shoulder angles, x20    4. Supine chin tuck with ret off table, x5 3x a day    5. UT stretch, [id number]    6. Serratus punch, x20    7. Wall Ws, x20    Therapeutic Treatments and Modalities:     1. Manual Therapy (CPT 56236), Grade I-III L cervical rotation; STM to scar tissue; grade I-II rotational mobs from c4 down to C7;    Time-based treatments/modalities:  Manual therapy minutes (CPT 31905): 10 minutes  Therapeutic exercise minutes (CPT 10540): 20 minutes       Pain rating before treatment: 3  Pain rating after treatment: 3    ASSESSMENT:   Response to treatment: Patient is responding well to therapy with an overall decrease in pain and improvement in ROM. Noted 50% improvement in ROM.     PLAN/RECOMMENDATIONS:   Plan for treatment: therapy treatment to continue next visit.  Planned interventions for next visit: continue with current treatment.

## 2019-10-28 ENCOUNTER — PHYSICAL THERAPY (OUTPATIENT)
Dept: PHYSICAL THERAPY | Facility: REHABILITATION | Age: 39
End: 2019-10-28
Attending: FAMILY MEDICINE
Payer: COMMERCIAL

## 2019-10-28 DIAGNOSIS — M54.2 NECK PAIN: ICD-10-CM

## 2019-10-28 PROCEDURE — 97140 MANUAL THERAPY 1/> REGIONS: CPT

## 2019-10-28 PROCEDURE — 97110 THERAPEUTIC EXERCISES: CPT

## 2019-10-28 NOTE — OP THERAPY DAILY TREATMENT
Outpatient Physical Therapy  DAILY TREATMENT     [facility] Outpatient Physical Therapy [place]  [address]  [place] [address]  Phone:  [phone]  Fax:  [phone]    Date: 10/28/2019    Patient: Татьяна Velez  YOB: 1980  MRN: [id number]     Time Calculation  Start time: 1700  Stop time: 1730 Time Calculation (min): 30 minutes       Chief Complaint: Neck Problem    Visit #: 8    SUBJECTIVE:  Overall still doing better. Notes that symptoms were worse in the last 3 days. States that symptoms today are doing great.     OBJECTIVE:  Current objective measures:   PRE  L rot: 60deg  R rot: 59deg    Post:   65 Bdeg rot          Therapeutic Exercises (CPT 69595):     1. Scap ret / dep, x20    2. Occipital float, x20 rot    3. Shoulder angles, x20    4. Supine chin tuck with ret off table, x5 3x a day    5. UT stretch, [id number]    6. Serratus punch, x20    7. Wall Ws, x20    8. I's , x10    9. T's, x10    10. Bird dog, x10    Therapeutic Treatments and Modalities:     1. Manual Therapy (CPT 60201), Grade I-III L cervical rotation; STM to scar tissue; grade I-II rotational mobs from c4 down to C7;    Time-based treatments/modalities:  Manual therapy minutes (CPT 92181): 10 minutes  Therapeutic exercise minutes (CPT 95934): 20 minutes       Pain rating before treatment: 2  Pain rating after treatment: 2    ASSESSMENT:   Response to treatment: Patient continues to respond well to therapy with an overall decrease in pain and improvement of symptoms.     PLAN/RECOMMENDATIONS:   Plan for treatment: therapy treatment to continue next visit.  Planned interventions for next visit: continue with current treatment.

## 2019-11-11 ENCOUNTER — PHYSICAL THERAPY (OUTPATIENT)
Dept: PHYSICAL THERAPY | Facility: REHABILITATION | Age: 39
End: 2019-11-11
Attending: FAMILY MEDICINE
Payer: COMMERCIAL

## 2019-11-11 DIAGNOSIS — M54.2 NECK PAIN: ICD-10-CM

## 2019-11-11 PROCEDURE — 97110 THERAPEUTIC EXERCISES: CPT

## 2019-11-11 PROCEDURE — 97140 MANUAL THERAPY 1/> REGIONS: CPT

## 2019-11-11 NOTE — OP THERAPY DAILY TREATMENT
Outpatient Physical Therapy  DAILY TREATMENT     [facility] Out[facility] Physical [facility] [place]  [address], [address]  [place] [address]  Phone:  [phone]  Fax:  [phone]    Date: 11/11/2019    Patient: Татьяна Velez  YOB: 1980  MRN: [id number]     Time Calculation  Start time: 1300  Stop time: 1330 Time Calculation (min): 30 minutes       Chief Complaint: Neck Problem    Visit #: 9    SUBJECTIVE:  Patient reports that she is feeling much better. Notes that symptoms have not been this good in awhile. Notes that she got her dental appliance.     OBJECTIVE:  Current objective measures:   Cervical rotation R 53deg  Cervical rotation L 73          Therapeutic Exercises (CPT 13675):     1. Scap ret / dep, x20    2. Occipital float, x20 rot    3. Shoulder angles, x20    4. Supine chin tuck with ret off table, x5 3x a day    5. UT stretch, [id number]    6. Serratus punch, x20    7. Wall Ws, x20    8. I's , x10    9. T's, x10    10. Bird dog, x10      Time-based treatments/modalities:  Therapeutic exercise minutes (CPT 33883): 30 minutes       Pain rating before treatment: 1  Pain rating after treatment: 1    ASSESSMENT:   Response to treatment: Patient is responding well to therapy and will continue with HEP. Patient will follow up via phone call next week.     PLAN/RECOMMENDATIONS:   Plan for treatment: therapy treatment to continue next visit.  Planned interventions for next visit: continue with current treatment.

## 2019-12-23 ENCOUNTER — SLEEP CENTER VISIT (OUTPATIENT)
Dept: SLEEP MEDICINE | Facility: MEDICAL CENTER | Age: 39
End: 2019-12-23
Payer: COMMERCIAL

## 2019-12-23 VITALS
SYSTOLIC BLOOD PRESSURE: 122 MMHG | BODY MASS INDEX: 36.96 KG/M2 | HEIGHT: 66 IN | WEIGHT: 230 LBS | RESPIRATION RATE: 14 BRPM | OXYGEN SATURATION: 98 % | HEART RATE: 71 BPM | DIASTOLIC BLOOD PRESSURE: 80 MMHG

## 2019-12-23 DIAGNOSIS — G47.33 OSA (OBSTRUCTIVE SLEEP APNEA): ICD-10-CM

## 2019-12-23 PROCEDURE — 99213 OFFICE O/P EST LOW 20 MIN: CPT | Performed by: FAMILY MEDICINE

## 2019-12-23 NOTE — PROGRESS NOTES
[facility] Follow Up Note     Date: 12/23/2019 / Time: 7:48 AM    Patient ID:   Name:             Татьяна Velez   YOB: 1980  Age:                 39 y.o.  female   MRN:               [id number]      Thank you for requesting a sleep medicine consultation on Татьяна Velez at the sleep center. She presents today with the chief complaints of SAMMY follow up.     HISTORY OF PRESENT ILLNESS:       Pt is currently on mandibular advancement device. She goes to sleep around 10 pm and wakes up around 6:30 am. She is getting about 6-7 hrs of sleep on a good night and about 4-5 hr of sleep on a bad night. The bad nights are rare since she has been on the treatment. Overall,  She does finds her sleep refreshing.She does not take regular naps.  The symptoms of SAMMY has improved. The chronic HA has significantly improved. EPWORTH is 11/24.     She will be seeing Dr. Bauer today for her f/u and Dr. Meraz (ENT) in January for possible surgery.    She has symptoms of restless legs syndrome. It has been going since 2016 ian. Ferritin was checked on 7/16/19 which showed 12 ng/ml. She was started on iron supplements and the RLS symptoms have resolved. It rarely interfered with sleep onset but denies with sleep maintenance.     SLEEP HISTORY   PSG showed Mild OAS with AHI of 10.8/hr and O2 jeff 90 %.23% of the apneas were central apneas      REVIEW OF SYSTEMS:       Constitutional: Denies fevers, Denies weight changes  Eyes: Denies changes in vision, no eye pain  Ears/Nose/Throat/Mouth: Denies nasal congestion or sore throat   Cardiovascular: Denies chest pain or palpitations   Respiratory: Denies shortness of breath , Denies cough  Gastrointestinal/Hepatic: Denies abdominal pain, nausea,  Skin/Breast: Denies rash,   Neurological: Denies headache, confusion, memory loss or focal weakness/parasthesias  Psychiatric: denies mood disorder   Sleep: denies snoring     Comprehensive review of  systems form is reviewed with the patient and is attached in the EMR.     PMH:  has a past medical history of Bronchitis, Chickenpox, GERD (gastroesophageal reflux disease), Goiter (4/30/2019), Hypertension, Melanoma ([facility]), Migraine, Mild depression (HCC) (3/19/2019), Nasal drainage, Tonsillitis, and Venereal disease. She also has no past medical history of Anemia, Anxiety, Arrhythmia, Asthma, Blood transfusion without reported diagnosis, CHF (congestive heart failure) ([facility]), Clotting disorder (HCC), COPD (chronic obstructive pulmonary disease) (HCC), Diabetes (HCC), Heart attack (HCC), Heart murmur, IBD (inflammatory bowel disease), Kidney disease, Seizure (HCC), Stroke ([facility]), Substance abuse ([facility]), or Urinary tract infection.  MEDS:   Current Outpatient Medications:   •  ferrous sulfate 325 (65 Fe) MG tablet, Take 325 mg by mouth every day., Disp: , Rfl:   •  cyanocobalamin (VITAMIN B-12) 500 MCG Tab, Take 500 mcg by mouth every day., Disp: , Rfl:   •  omeprazole (PRILOSEC) 20 MG delayed-release capsule, Take 1 Cap by mouth every day., Disp: 90 Cap, Rfl: 0  •  hydroCHLOROthiazide (HYDRODIURIL) 25 MG Tab, Take 1 Tab by mouth every day., Disp: 90 Tab, Rfl: 1  •  montelukast (SINGULAIR) 10 MG Tab, Take 1 Tab by mouth every day., Disp: 90 Tab, Rfl: 2  •  fluticasone (FLONASE) 50 MCG/ACT nasal spray, Spray 1 Spray in nose every day., Disp: 3 Bottle, Rfl: 1  •  ergocalciferol (DRISDOL) 17259 UNIT capsule, Take one tab po once a week., Disp: 12 Cap, Rfl: 1  •  PAZEO 0.7 % Solution, INSTILL 1 DROP INTO BOTH EYES EVERY MORNING AS DIRECTED, Disp: , Rfl: 6  ALLERGIES:   Allergies   Allergen Reactions   • Sulfa Drugs      SURGHX:   Past Surgical History:   Procedure Laterality Date   • SKIN RESURFACING      removal melanoma as kid     SOCHX:  reports that she has never smoked. She has never used smokeless tobacco. She reports that she does not drink alcohol or use drugs..  FH:   Family History   Problem Relation Age of Onset  "  • Heart Disease Maternal Grandmother    • Heart Disease Paternal Grandfather         heart attack   • Hypertension Mother    • Diabetes Mother    • Obesity Mother    • Hypertension Father    • Obesity Father    • Sleep Apnea Father    • Alcohol/Drug Maternal Grandfather    • Lung Disease Paternal Grandmother    • Heart Disease Paternal Grandmother         many heart attacks         Physical Exam:  Vitals/ General Appearance:   Weight/BMI: Body mass index is 37.12 kg/m².  /80 (BP Location: Right arm, Patient Position: Sitting, BP Cuff Size: Adult)   Pulse 71   Resp 14   Ht 1.676 m (5' 6\")   Wt 104.3 kg (230 lb)   SpO2 98%   Vitals:    12/23/19 0745   BP: 122/80   BP Location: Right arm   Patient Position: Sitting   BP Cuff Size: Adult   Pulse: 71   Resp: 14   SpO2: 98%   Weight: 104.3 kg (230 lb)   Height: 1.676 m (5' 6\")       Pt. is alert and oriented to time, place and person. Cooperative and in no apparent distress.       -Head: Atraumatic, normocephalic.   -. Nose: No inferior turbinate hypertophy  -. Throat: Oropharynx appears crowded in that the palate is  Overhanging. pharynx not inflamed.   -. Neck: Supple. No thyromegaly  -. Chest: Trachea central, no spine deformity   -. Lungs auscultation: B/L good air entry, vesicular breath sounds, no adventitious sounds  -. Heart auscultation: 1st and 2nd heart sounds normal, regular rhythm. No appreciable murmur.  - Extremities: no pedal edema.  - Skin: No visbile rash  - NEUROLOGICAL EXAMINATION: On neurological exam, the patient was alert and oriented x3. speech was clear and fluent without dysarthria.      ASSESSMENT AND PLAN     1. Sleep Apnea      The pathophysiology of sleep anea and the increased risk of cardiovascular morbidity from untreated sleep apnea is discussed in detail with the patient.    She is urged to avoid supine sleep, weight gain and alcoholic beverages since all of these can worsen sleep apnea. She is cautioned against drowsy " driving. If She feels sleepy while driving, She must pull over for a break/nap, rather than persist on the road, in the interest of She own safety and that of others on the road.   Plan   - Continue Dental appliance    - HST is order to assess the efficacy of the OAT   - compliance was reinforced     2. Regarding treatment of other past medical problems and general health maintenance,  She is urged to follow up with PCP.

## 2019-12-26 ENCOUNTER — APPOINTMENT (OUTPATIENT)
Dept: SLEEP MEDICINE | Facility: MEDICAL CENTER | Age: 39
End: 2019-12-26
Payer: COMMERCIAL

## 2019-12-28 ENCOUNTER — OFFICE VISIT (OUTPATIENT)
Dept: URGENT CARE | Facility: PHYSICIAN GROUP | Age: 39
End: 2019-12-28
Payer: COMMERCIAL

## 2019-12-28 VITALS
TEMPERATURE: 99 F | DIASTOLIC BLOOD PRESSURE: 88 MMHG | HEART RATE: 85 BPM | WEIGHT: 218 LBS | RESPIRATION RATE: 16 BRPM | BODY MASS INDEX: 35.19 KG/M2 | OXYGEN SATURATION: 99 % | SYSTOLIC BLOOD PRESSURE: 130 MMHG

## 2019-12-28 DIAGNOSIS — R05.9 COUGH: ICD-10-CM

## 2019-12-28 DIAGNOSIS — J06.9 VIRAL URI WITH COUGH: ICD-10-CM

## 2019-12-28 LAB
FLUAV+FLUBV AG SPEC QL IA: NORMAL
INT CON NEG: NEGATIVE
INT CON POS: POSITIVE

## 2019-12-28 PROCEDURE — 99213 OFFICE O/P EST LOW 20 MIN: CPT | Performed by: NURSE PRACTITIONER

## 2019-12-28 PROCEDURE — 87804 INFLUENZA ASSAY W/OPTIC: CPT | Performed by: NURSE PRACTITIONER

## 2019-12-28 ASSESSMENT — ENCOUNTER SYMPTOMS
SORE THROAT: 1
COUGH: 1
WHEEZING: 0
SHORTNESS OF BREATH: 0
CHILLS: 0
DIARRHEA: 0
FEVER: 0
MYALGIAS: 1
HEADACHES: 1
SPUTUM PRODUCTION: 1
NAUSEA: 0
ORTHOPNEA: 0
EYE DISCHARGE: 0

## 2019-12-28 NOTE — PROGRESS NOTES
Subjective:      Татьяна Velez is a 39 y.o. female who presents with Cough (chills,aches,fever,x 2 days)            HPI New. 39 year old female with cough, chills and fever for 2 days. She has no fever in clinic today. She has no nasal congestion, diarrhea or nausea. She is concerned as her father was diagnosed with influenza A this past week. She has had her flu shot. Taking over the counter cough and cold. Cough is productive of green mucous and she reports some shortness of breath this morning but better after a shower.  Sulfa drugs  Current Outpatient Medications on File Prior to Visit   Medication Sig Dispense Refill   • ferrous sulfate 325 (65 Fe) MG tablet Take 325 mg by mouth every day.     • cyanocobalamin (VITAMIN B-12) 500 MCG Tab Take 500 mcg by mouth every day.     • omeprazole (PRILOSEC) 20 MG delayed-release capsule Take 1 Cap by mouth every day. 90 Cap 0   • hydroCHLOROthiazide (HYDRODIURIL) 25 MG Tab Take 1 Tab by mouth every day. 90 Tab 1   • montelukast (SINGULAIR) 10 MG Tab Take 1 Tab by mouth every day. 90 Tab 2   • fluticasone (FLONASE) 50 MCG/ACT nasal spray Spray 1 Spray in nose every day. 3 Bottle 1   • ergocalciferol (DRISDOL) 00922 UNIT capsule Take one tab po once a week. 12 Cap 1   • PAZEO 0.7 % Solution INSTILL 1 DROP INTO BOTH EYES EVERY MORNING AS DIRECTED  6     No current facility-administered medications on file prior to visit.      Social History     Socioeconomic History   • Marital status:      Spouse name: Not on file   • Number of children: Not on file   • Years of education: Not on file   • Highest education level: Not on file   Occupational History   • Not on file   Social Needs   • Financial resource strain: Not on file   • Food insecurity:     Worry: Not on file     Inability: Not on file   • Transportation needs:     Medical: Not on file     Non-medical: Not on file   Tobacco Use   • Smoking status: Never Smoker   • Smokeless tobacco: Never Used    Substance and Sexual Activity   • Alcohol use: No   • Drug use: No   • Sexual activity: Never     Comment: Lives with her . No kids. Works full time as  and clothing business. No social concerns.   Lifestyle   • Physical activity:     Days per week: Not on file     Minutes per session: Not on file   • Stress: Not on file   Relationships   • Social connections:     Talks on phone: Not on file     Gets together: Not on file     Attends [employer] service: Not on file     Active member of club or organization: Not on file     Attends meetings of clubs or organizations: Not on file     Relationship status: Not on file   • Intimate partner violence:     Fear of current or ex partner: Not on file     Emotionally abused: Not on file     Physically abused: Not on file     Forced sexual activity: Not on file   Other Topics Concern   • Not on file   Social History Narrative   • Not on file     Breast Cancer-related family history is not on file.      Review of Systems   Constitutional: Positive for malaise/fatigue. Negative for chills and fever.   HENT: Positive for congestion and sore throat.    Eyes: Negative for discharge.   Respiratory: Positive for cough and sputum production. Negative for shortness of breath and wheezing.    Cardiovascular: Negative for chest pain and orthopnea.   Gastrointestinal: Negative for diarrhea and nausea.   Musculoskeletal: Positive for myalgias.   Neurological: Positive for headaches.   Endo/Heme/Allergies: Negative for environmental allergies.          Objective:     /88 (BP Location: Right arm, Patient Position: Sitting, BP Cuff Size: Large adult)   Pulse 85   Temp 37.2 °C (99 °F) (Temporal)   Resp 16   Wt 98.9 kg (218 lb)   SpO2 99%   BMI 35.19 kg/m²      Physical Exam  Vitals signs and nursing note reviewed.   Constitutional:       General: She is not in acute distress.     Appearance: She is well-developed.   HENT:      Head: Normocephalic and  atraumatic.      Right Ear: Tympanic membrane, ear canal and external ear normal. No middle ear effusion. Tympanic membrane is not injected or perforated.      Left Ear: Tympanic membrane, ear canal and external ear normal.  No middle ear effusion. Tympanic membrane is not injected or perforated.      Nose: Mucosal edema present.      Mouth/Throat:      Pharynx: Posterior oropharyngeal erythema present. No oropharyngeal exudate.   Eyes:      General:         Right eye: No discharge.         Left eye: No discharge.      Conjunctiva/sclera: Conjunctivae normal.   Neck:      Musculoskeletal: Normal range of motion and neck supple.   Cardiovascular:      Rate and Rhythm: Normal rate and regular rhythm.      Heart sounds: Normal heart sounds. No murmur.   Pulmonary:      Effort: Pulmonary effort is normal. No respiratory distress.      Breath sounds: Normal breath sounds.   Musculoskeletal: Normal range of motion.      Comments: Normal movement of all 4 extremities.   Lymphadenopathy:      Cervical: No cervical adenopathy.      Upper Body:      Right upper body: No supraclavicular adenopathy.      Left upper body: No supraclavicular adenopathy.   Skin:     General: Skin is warm and dry.   Neurological:      Mental Status: She is alert and oriented to person, place, and time.      Gait: Gait normal.   Psychiatric:         Behavior: Behavior normal.         Thought Content: Thought content normal.                 Assessment/Plan:       1. Viral URI with cough     2. Cough  POCT Influenza A/B     Flu negative.  Viral illness at this time with no indication for antibiotics. Reviewed with patient expected course of illness and also reviewed OTC medications that may be used for symptom relief. Follow up 7-10 days if not improving.

## 2020-01-03 ENCOUNTER — HOSPITAL ENCOUNTER (OUTPATIENT)
Dept: LAB | Facility: MEDICAL CENTER | Age: 40
End: 2020-01-03
Attending: FAMILY MEDICINE
Payer: COMMERCIAL

## 2020-01-03 DIAGNOSIS — E78.5 DYSLIPIDEMIA: ICD-10-CM

## 2020-01-03 DIAGNOSIS — Z51.81 MEDICATION MONITORING ENCOUNTER: ICD-10-CM

## 2020-01-03 DIAGNOSIS — E61.1 IRON DEFICIENCY: ICD-10-CM

## 2020-01-03 DIAGNOSIS — I10 ESSENTIAL HYPERTENSION: ICD-10-CM

## 2020-01-03 LAB
ALBUMIN SERPL BCP-MCNC: 4.2 G/DL (ref 3.2–4.9)
ALBUMIN/GLOB SERPL: 1.2 G/DL
ALP SERPL-CCNC: 77 U/L (ref 30–99)
ALT SERPL-CCNC: 17 U/L (ref 2–50)
ANION GAP SERPL CALC-SCNC: 9 MMOL/L (ref 0–11.9)
AST SERPL-CCNC: 19 U/L (ref 12–45)
BASOPHILS # BLD AUTO: 0.5 % (ref 0–1.8)
BASOPHILS # BLD: 0.04 K/UL (ref 0–0.12)
BILIRUB SERPL-MCNC: 0.6 MG/DL (ref 0.1–1.5)
BUN SERPL-MCNC: 14 MG/DL (ref 8–22)
CALCIUM SERPL-MCNC: 9.2 MG/DL (ref 8.5–10.5)
CHLORIDE SERPL-SCNC: 101 MMOL/L (ref 96–112)
CHOLEST SERPL-MCNC: 175 MG/DL (ref 100–199)
CO2 SERPL-SCNC: 28 MMOL/L (ref 20–33)
CREAT SERPL-MCNC: 0.82 MG/DL (ref 0.5–1.4)
EOSINOPHIL # BLD AUTO: 0.23 K/UL (ref 0–0.51)
EOSINOPHIL NFR BLD: 2.9 % (ref 0–6.9)
ERYTHROCYTE [DISTWIDTH] IN BLOOD BY AUTOMATED COUNT: 43.9 FL (ref 35.9–50)
GLOBULIN SER CALC-MCNC: 3.6 G/DL (ref 1.9–3.5)
GLUCOSE SERPL-MCNC: 81 MG/DL (ref 65–99)
HCT VFR BLD AUTO: 48.8 % (ref 37–47)
HDLC SERPL-MCNC: 38 MG/DL
HGB BLD-MCNC: 15.5 G/DL (ref 12–16)
IMM GRANULOCYTES # BLD AUTO: 0.03 K/UL (ref 0–0.11)
IMM GRANULOCYTES NFR BLD AUTO: 0.4 % (ref 0–0.9)
LDLC SERPL CALC-MCNC: 113 MG/DL
LYMPHOCYTES # BLD AUTO: 3.36 K/UL (ref 1–4.8)
LYMPHOCYTES NFR BLD: 41.6 % (ref 22–41)
MCH RBC QN AUTO: 27.8 PG (ref 27–33)
MCHC RBC AUTO-ENTMCNC: 31.8 G/DL (ref 33.6–35)
MCV RBC AUTO: 87.5 FL (ref 81.4–97.8)
MONOCYTES # BLD AUTO: 0.58 K/UL (ref 0–0.85)
MONOCYTES NFR BLD AUTO: 7.2 % (ref 0–13.4)
NEUTROPHILS # BLD AUTO: 3.83 K/UL (ref 2–7.15)
NEUTROPHILS NFR BLD: 47.4 % (ref 44–72)
NRBC # BLD AUTO: 0 K/UL
NRBC BLD-RTO: 0 /100 WBC
PLATELET # BLD AUTO: 357 K/UL (ref 164–446)
PMV BLD AUTO: 9.9 FL (ref 9–12.9)
POTASSIUM SERPL-SCNC: 3.5 MMOL/L (ref 3.6–5.5)
PROT SERPL-MCNC: 7.8 G/DL (ref 6–8.2)
RBC # BLD AUTO: 5.58 M/UL (ref 4.2–5.4)
SODIUM SERPL-SCNC: 138 MMOL/L (ref 135–145)
TRIGL SERPL-MCNC: 121 MG/DL (ref 0–149)
WBC # BLD AUTO: 8.1 K/UL (ref 4.8–10.8)

## 2020-01-03 PROCEDURE — 82728 ASSAY OF FERRITIN: CPT

## 2020-01-03 PROCEDURE — 80053 COMPREHEN METABOLIC PANEL: CPT

## 2020-01-03 PROCEDURE — 80061 LIPID PANEL: CPT

## 2020-01-03 PROCEDURE — 36415 COLL VENOUS BLD VENIPUNCTURE: CPT

## 2020-01-03 PROCEDURE — 85025 COMPLETE CBC W/AUTO DIFF WBC: CPT

## 2020-01-04 LAB — FERRITIN SERPL-MCNC: 18.7 NG/ML (ref 10–291)

## 2020-01-06 ENCOUNTER — OFFICE VISIT (OUTPATIENT)
Dept: MEDICAL GROUP | Facility: PHYSICIAN GROUP | Age: 40
End: 2020-01-06
Payer: COMMERCIAL

## 2020-01-06 VITALS
SYSTOLIC BLOOD PRESSURE: 120 MMHG | BODY MASS INDEX: 35.77 KG/M2 | DIASTOLIC BLOOD PRESSURE: 80 MMHG | TEMPERATURE: 98.9 F | RESPIRATION RATE: 14 BRPM | HEART RATE: 79 BPM | OXYGEN SATURATION: 98 % | WEIGHT: 222.6 LBS | HEIGHT: 66 IN

## 2020-01-06 DIAGNOSIS — E78.5 DYSLIPIDEMIA: ICD-10-CM

## 2020-01-06 DIAGNOSIS — E87.6 HYPOKALEMIA: ICD-10-CM

## 2020-01-06 DIAGNOSIS — F32.A MILD DEPRESSION: ICD-10-CM

## 2020-01-06 DIAGNOSIS — Z51.81 MEDICATION MONITORING ENCOUNTER: ICD-10-CM

## 2020-01-06 DIAGNOSIS — E53.8 VITAMIN B12 DEFICIENCY: ICD-10-CM

## 2020-01-06 DIAGNOSIS — K21.9 GASTROESOPHAGEAL REFLUX DISEASE WITHOUT ESOPHAGITIS: ICD-10-CM

## 2020-01-06 DIAGNOSIS — I10 ESSENTIAL HYPERTENSION: ICD-10-CM

## 2020-01-06 DIAGNOSIS — Z23 NEED FOR VACCINATION: ICD-10-CM

## 2020-01-06 DIAGNOSIS — G44.229 CHRONIC TENSION-TYPE HEADACHE, NOT INTRACTABLE: ICD-10-CM

## 2020-01-06 DIAGNOSIS — R73.9 HYPERGLYCEMIA: ICD-10-CM

## 2020-01-06 DIAGNOSIS — J30.1 SEASONAL ALLERGIC RHINITIS DUE TO POLLEN: ICD-10-CM

## 2020-01-06 DIAGNOSIS — E55.9 VITAMIN D DEFICIENCY: ICD-10-CM

## 2020-01-06 DIAGNOSIS — R53.83 OTHER FATIGUE: ICD-10-CM

## 2020-01-06 DIAGNOSIS — M54.2 NECK PAIN: ICD-10-CM

## 2020-01-06 PROCEDURE — 90651 9VHPV VACCINE 2/3 DOSE IM: CPT | Performed by: FAMILY MEDICINE

## 2020-01-06 PROCEDURE — 99214 OFFICE O/P EST MOD 30 MIN: CPT | Mod: 25 | Performed by: FAMILY MEDICINE

## 2020-01-06 PROCEDURE — 90746 HEPB VACCINE 3 DOSE ADULT IM: CPT | Performed by: FAMILY MEDICINE

## 2020-01-06 PROCEDURE — 90471 IMMUNIZATION ADMIN: CPT | Performed by: FAMILY MEDICINE

## 2020-01-06 PROCEDURE — 90472 IMMUNIZATION ADMIN EACH ADD: CPT | Performed by: FAMILY MEDICINE

## 2020-01-06 RX ORDER — FLUTICASONE PROPIONATE 50 MCG
1 SPRAY, SUSPENSION (ML) NASAL DAILY
Qty: 3 BOTTLE | Refills: 3 | Status: SHIPPED | OUTPATIENT
Start: 2020-01-06 | End: 2020-05-14 | Stop reason: CLARIF

## 2020-01-06 RX ORDER — HYDROCHLOROTHIAZIDE 25 MG/1
25 TABLET ORAL
Qty: 90 TAB | Refills: 2 | Status: SHIPPED | OUTPATIENT
Start: 2020-01-06 | End: 2020-10-27

## 2020-01-06 RX ORDER — OMEPRAZOLE 20 MG/1
20 CAPSULE, DELAYED RELEASE ORAL DAILY
Qty: 90 CAP | Refills: 1 | Status: SHIPPED | OUTPATIENT
Start: 2020-01-06 | End: 2020-10-07

## 2020-01-06 RX ORDER — POTASSIUM CHLORIDE 20 MEQ/1
20 TABLET, EXTENDED RELEASE ORAL DAILY
Qty: 90 TAB | Refills: 1 | Status: SHIPPED | OUTPATIENT
Start: 2020-01-06 | End: 2020-05-14 | Stop reason: CLARIF

## 2020-01-06 RX ORDER — MONTELUKAST SODIUM 10 MG/1
10 TABLET ORAL DAILY
Qty: 90 TAB | Refills: 3 | Status: SHIPPED | OUTPATIENT
Start: 2020-01-06 | End: 2021-03-11

## 2020-01-06 RX ORDER — ATORVASTATIN CALCIUM 20 MG/1
20 TABLET, FILM COATED ORAL
Qty: 90 TAB | Refills: 1 | Status: SHIPPED | OUTPATIENT
Start: 2020-01-06 | End: 2020-05-14 | Stop reason: CLARIF

## 2020-01-06 ASSESSMENT — PATIENT HEALTH QUESTIONNAIRE - PHQ9
SUM OF ALL RESPONSES TO PHQ QUESTIONS 1-9: 0
4. FEELING TIRED OR HAVING LITTLE ENERGY: NOT AT ALL
3. TROUBLE FALLING OR STAYING ASLEEP OR SLEEPING TOO MUCH: NOT AT ALL
SUM OF ALL RESPONSES TO PHQ9 QUESTIONS 1 AND 2: 0
9. THOUGHTS THAT YOU WOULD BE BETTER OFF DEAD, OR OF HURTING YOURSELF: NOT AT ALL
8. MOVING OR SPEAKING SO SLOWLY THAT OTHER PEOPLE COULD HAVE NOTICED. OR THE OPPOSITE, BEING SO FIGETY OR RESTLESS THAT YOU HAVE BEEN MOVING AROUND A LOT MORE THAN USUAL: NOT AT ALL
7. TROUBLE CONCENTRATING ON THINGS, SUCH AS READING THE NEWSPAPER OR WATCHING TELEVISION: NOT AT ALL
1. LITTLE INTEREST OR PLEASURE IN DOING THINGS: NOT AT ALL
6. FEELING BAD ABOUT YOURSELF - OR THAT YOU ARE A FAILURE OR HAVE LET YOURSELF OR YOUR FAMILY DOWN: NOT AL ALL
5. POOR APPETITE OR OVEREATING: NOT AT ALL
2. FEELING DOWN, DEPRESSED, IRRITABLE, OR HOPELESS: NOT AT ALL

## 2020-01-07 NOTE — PROGRESS NOTES
cc: Mild hypokalemia, HPV and hepatitis B vaccine #2 of 3 series, hypertension stable, GERD improved, medication monitoring, allergies improved, neck pain and headaches improved    Subjective:     Татьяна Velez is a 39 y.o. female presenting she was last seen October 8 when we did her well woman exam.  She is taking the omeprazole 40 mg and when she finishes that she will step down to the omeprazole 20 mg and is not getting any heartburn.  She still taking the hydrochlorothiazide 25 mg daily.  Her blood pressure has been stable on hydrochlorothiazide 25 mg daily in the morning.  She is due for her hepatitis B vaccine and HPV vaccine #2 of 3 series for both the vaccine and next ones will be due April 13 or after.  She is aware of her Pap screening results with the results being negative.  She is doing iron, vitamin B12, vitamin D and due to her dental TMJ issues and sleep apnea her dentist has also given her vitamins for her to do.  Her headaches have improved since she has been using the dental appliance during the day and she has bilateral once at night.  She will be seeing the ear nose and throat specialist as well for possible evaluation of her nasal passage.  She did see the sleep center December 23 and they told her to continue her dental appliance.  She did go for physical therapy for her cervical issues which has improved and due to the co-pay she is going to stop for now and maybe go again in the new year.  She is using srikanth such as my fitness tracker and a progress srikanth to work on her diet and exercise and trying intermittent fasting.  January 3 she had lab work done and her CBC was within normal limits, complete metabolic panel with potassium mildly low at 3.5, ferritin has improved but on the lower end of normal, lipid panel with total cholesterol 175, triglycerides 121, HDL 38, .    Review of systems:     Constitutional: Negative for fever, chills and positive fatigue.   HENT: Negative  for sinus pressure  Eyes: Negative for blurriness  Respiratory: Negative for cough and shortness of breath, negative for exertional shortness of breath  Cardiovascular: Negative for leg swelling, negative for palpitations, negative for chest pain  Gastrointestinal: Negative for nausea, vomiting, abdominal pain, constipation and diarrhea.  Genitourinary: Negative for dysuria and hematuria.   Skin: Negative for rash.   Neurological: Negative for dizziness, focal weakness and headaches.   Endo/Heme/Allergies: Denies bleeding, bruising, and recurrent allergies.  Psychiatric/Behavioral: Negative for depression and anxiety. Mild depression in the past, denies any symptoms right now. Denies HI or SI.        Current Outpatient Medications:   •  potassium chloride SA (KDUR) 20 MEQ Tab CR, Take 1 Tab by mouth every day., Disp: 90 Tab, Rfl: 1  •  atorvastatin (LIPITOR) 20 MG Tab, Take 1 Tab by mouth every bedtime., Disp: 90 Tab, Rfl: 1  •  fluticasone (FLONASE) 50 MCG/ACT nasal spray, Spray 1 Spray in nose every day., Disp: 3 Bottle, Rfl: 3  •  montelukast (SINGULAIR) 10 MG Tab, Take 1 Tab by mouth every day., Disp: 90 Tab, Rfl: 3  •  omeprazole (PRILOSEC) 20 MG delayed-release capsule, Take 1 Cap by mouth every day., Disp: 90 Cap, Rfl: 1  •  hydroCHLOROthiazide (HYDRODIURIL) 25 MG Tab, Take 1 Tab by mouth every day., Disp: 90 Tab, Rfl: 2  •  ferrous sulfate 325 (65 Fe) MG tablet, Take 325 mg by mouth every day., Disp: , Rfl:   •  cyanocobalamin (VITAMIN B-12) 500 MCG Tab, Take 500 mcg by mouth every day., Disp: , Rfl:   •  ergocalciferol (DRISDOL) 85955 UNIT capsule, Take one tab po once a week., Disp: 12 Cap, Rfl: 1  •  PAZEO 0.7 % Solution, INSTILL 1 DROP INTO BOTH EYES EVERY MORNING AS DIRECTED, Disp: , Rfl: 6    Allergies, past medical history, past surgical history, family history, social history reviewed and updated    Objective:     Vitals: /80 (BP Location: Right arm, Patient Position: Sitting, BP Cuff Size:  "Adult)   Pulse 79   Temp 37.2 °C (98.9 °F) (Temporal)   Resp 14   Ht 1.676 m (5' 6\")   Wt 101 kg (222 lb 9.6 oz)   LMP 12/23/2019 (Approximate)   SpO2 98%   Breastfeeding? No   BMI 35.93 kg/m²   General: Alert, pleasant, NAD  HEENT: Normocephalic.  Nontraumatic. EOMI, no icterus or pallor.  Conjunctivae and lids normal. External ears normal.   Heart: Regular rate and rhythm.  S1 and S2 normal.  No murmurs appreciated.  Respiratory: Normal respiratory effort.  Clear to auscultation bilaterally.  Skin: Warm, dry, no rashes.  Musculoskeletal: Gait is normal.  Moves all extremities well.  Extremities: No leg edema.    Psych:  Affect/mood is normal, judgement is good, memory is intact, grooming is appropriate.    Assessment/Plan:     Diagnoses and all orders for this visit:    Dyslipidemia  -     atorvastatin (LIPITOR) 20 MG Tab; Take 1 Tab by mouth every bedtime.  -     Lipid Profile; Future    Need for vaccination  -     Hepatitis B Vaccine Adult 20+  -     9VHPV Vaccine 2-3 Dose IM  -     Hepatitis B Vaccine Adult IM; Future  -     9VHPV Vaccine 2-3 Dose IM; Future    Hypokalemia  -     potassium chloride SA (KDUR) 20 MEQ Tab CR; Take 1 Tab by mouth every day.  -     Comp Metabolic Panel; Future    Hyperglycemia  -     HEMOGLOBIN A1C; Future    BMI 35.0-35.9,adult    Chronic tension-type headache, not intractable    Essential hypertension  -     Comp Metabolic Panel; Future  -     hydroCHLOROthiazide (HYDRODIURIL) 25 MG Tab; Take 1 Tab by mouth every day.    Gastroesophageal reflux disease without esophagitis  -     omeprazole (PRILOSEC) 20 MG delayed-release capsule; Take 1 Cap by mouth every day.    Medication monitoring encounter  -     Comp Metabolic Panel; Future  -     TSH WITH REFLEX TO FT4; Future    Neck pain    Seasonal allergic rhinitis due to pollen  -     fluticasone (FLONASE) 50 MCG/ACT nasal spray; Spray 1 Spray in nose every day.  -     montelukast (SINGULAIR) 10 MG Tab; Take 1 Tab by mouth " every day.    Vitamin B12 deficiency    Vitamin D deficiency    Other fatigue  -     TSH WITH REFLEX TO FT4; Future    Mild depression (HCC)    -January 3 she had lab work done and her CBC was within normal limits, complete metabolic panel with potassium mildly low at 3.5, ferritin has improved but on the lower end of normal, lipid panel with total cholesterol 175, triglycerides 121, HDL 38, .  -We will get her #2 of the HPV and hepatitis B vaccine of the 3 series and order placed for her to come after April 13 to get the third and last of both HPV and hepatitis B.  And in 6 months she will repeat her fasting lab work of her CMP A1c, TSH and lipids.  We will start her on new medication atorvastatin 20 mg daily for her cholesterol due to her risk factors of her blood pressure and increase heart disease.  We will also start her on potassium 20 mEq daily as she is on hydrochlorothiazide and this is a diuretic that could decrease her potassium.  Her blood pressure is stable and no other side effects from hydrochlorothiazide 25 mg and we will keep her on hydrochlorothiazide 25 mg for blood pressure for desired blood pressure goal less than 130s over 90s and if it starts going higher or too low then please let us know.  She will continue vitamin B12 over-the-counter daily and iron over-the-counter daily and when she is done the high-dose vitamin D is she will switch over to the daily vitamin D 2000 units daily and she will continue for her allergies as needed montelukast and Flonase as this is helping.  And she will continue the omeprazole 20 mg once she finishes the 40 mg for her heartburn and she will continue to see the dentist who is working on her sleep apnea and TMJ and she will work on her diet and exercise and weight loss and let us know if she needs any additional help.  Her headaches have subsequently improved on her dental appliance.  Pap test done and was negative and she is not due for another 3 years.   Her allergies has also improved on medication and her depression is sometimes intermittently mild but not having any major concerns today.  Unless she is having any issues she will return earlier otherwise she can get her lab work done in 6 months and be seen for follow-up and do a nurse only visit for her vaccines after April 13.    Return in about 6 months (around 7/6/2020), or FU labs/BP/weight check.

## 2020-01-07 NOTE — PATIENT INSTRUCTIONS
Diagnoses and all orders for this visit:    Dyslipidemia  -     atorvastatin (LIPITOR) 20 MG Tab; Take 1 Tab by mouth every bedtime.  -     Lipid Profile; Future    Need for vaccination  -     Hepatitis B Vaccine Adult 20+  -     9VHPV Vaccine 2-3 Dose IM  -     Hepatitis B Vaccine Adult IM; Future  -     9VHPV Vaccine 2-3 Dose IM; Future    Hypokalemia  -     potassium chloride SA (KDUR) 20 MEQ Tab CR; Take 1 Tab by mouth every day.  -     Comp Metabolic Panel; Future    Hyperglycemia  -     HEMOGLOBIN A1C; Future    BMI 35.0-35.9,adult    Chronic tension-type headache, not intractable    Essential hypertension  -     Comp Metabolic Panel; Future  -     hydroCHLOROthiazide (HYDRODIURIL) 25 MG Tab; Take 1 Tab by mouth every day.    Gastroesophageal reflux disease without esophagitis  -     omeprazole (PRILOSEC) 20 MG delayed-release capsule; Take 1 Cap by mouth every day.    Medication monitoring encounter  -     Comp Metabolic Panel; Future  -     TSH WITH REFLEX TO FT4; Future    Neck pain    Seasonal allergic rhinitis due to pollen  -     fluticasone (FLONASE) 50 MCG/ACT nasal spray; Spray 1 Spray in nose every day.  -     montelukast (SINGULAIR) 10 MG Tab; Take 1 Tab by mouth every day.    Vitamin B12 deficiency    Vitamin D deficiency    Other fatigue  -     TSH WITH REFLEX TO FT4; Future    Mild depression (HCC)    -January 3 she had lab work done and her CBC was within normal limits, complete metabolic panel with potassium mildly low at 3.5, ferritin has improved but on the lower end of normal, lipid panel with total cholesterol 175, triglycerides 121, HDL 38, .  -We will get her #2 of the HPV and hepatitis B vaccine of the 3 series and order placed for her to come after April 13 to get the third and last of both HPV and hepatitis B.  And in 6 months she will repeat her fasting lab work of her CMP A1c, TSH and lipids.  We will start her on new medication atorvastatin 20 mg daily for her cholesterol due  to her risk factors of her blood pressure and increase heart disease.  We will also start her on potassium 20 mEq daily as she is on hydrochlorothiazide and this is a diuretic that could decrease her potassium.  Her blood pressure is stable and no other side effects from hydrochlorothiazide 25 mg and we will keep her on hydrochlorothiazide 25 mg for blood pressure for desired blood pressure goal less than 130s over 90s and if it starts going higher or too low then please let us know.  She will continue vitamin B12 over-the-counter daily and iron over-the-counter daily and when she is done the high-dose vitamin D is she will switch over to the daily vitamin D 2000 units daily and she will continue for her allergies as needed montelukast and Flonase as this is helping.  And she will continue the omeprazole 20 mg once she finishes the 40 mg for her heartburn and she will continue to see the dentist who is working on her sleep apnea and TMJ and she will work on her diet and exercise and weight loss and let us know if she needs any additional help.  Her headaches have subsequently improved on her dental appliance.  Pap test done and was negative and she is not due for another 3 years.  Her allergies has also improved on medication and her depression is sometimes intermittently mild but not having any major concerns today.  Unless she is having any issues she will return earlier otherwise she can get her lab work done in 6 months and be seen for follow-up and do a nurse only visit for her vaccines after April 13.    Return in about 6 months (around 7/6/2020), or FU labs/BP/weight check.

## 2020-01-16 ENCOUNTER — TELEPHONE (OUTPATIENT)
Dept: PHYSICAL THERAPY | Facility: REHABILITATION | Age: 40
End: 2020-01-16

## 2020-01-16 NOTE — OP THERAPY DISCHARGE SUMMARY
Outpatient Physical Therapy  DISCHARGE SUMMARY NOTE      [facility] [place]  [address], [address]  [place] [facility] [address]  Phone:  [phone]  Fax:  [phone]    Date of Visit: 01/16/2020    Patient: Татьяна Velez  YOB: 1980  MRN: [id number]     Referring Provider: Chris Pradhan M.D.   Referring Diagnosis Neck pain [M54.2]     Physical Therapy Occurrence Codes    Date of onset of impairment:  9/11/09   Date physical therapy care plan established or reviewed:  9/9/19   Date physical therapy treatment started:  9/9/19            Your patient is being discharged from Physical Therapy with the following comments:   · Goals partially met    Comments:  Patient will continue with HEP     Limitations Remaining:  See last note    Recommendations:  Discharge to HEP    Jim Serra, PT, DPT    Date: 1/16/2020

## 2020-01-19 DIAGNOSIS — E55.9 VITAMIN D DEFICIENCY: ICD-10-CM

## 2020-01-21 RX ORDER — ERGOCALCIFEROL 1.25 MG/1
CAPSULE ORAL
Qty: 12 CAP | Refills: 1 | OUTPATIENT
Start: 2020-01-21

## 2020-01-25 DIAGNOSIS — K21.9 GASTROESOPHAGEAL REFLUX DISEASE WITHOUT ESOPHAGITIS: ICD-10-CM

## 2020-01-27 RX ORDER — OMEPRAZOLE 40 MG/1
CAPSULE, DELAYED RELEASE ORAL
Qty: 90 CAP | Refills: 0 | Status: SHIPPED | OUTPATIENT
Start: 2020-01-27 | End: 2020-05-14 | Stop reason: CLARIF

## 2020-01-27 NOTE — TELEPHONE ENCOUNTER
*PHARMACY REQUESTED 40MG*  Was the patient seen in the last year in this department? Yes    Does patient have an active prescription for medications requested? YES    Received Request Via: Pharmacy      Pt met protocol?: Yes    LAST OV 01/06/2020

## 2020-01-27 NOTE — TELEPHONE ENCOUNTER
Dr Pradhan- Pt was originally on omeprazole 40mg last year and changed to 20mg. Pt would like to go back on 40mg. Please refill as you see fit.

## 2020-01-28 NOTE — TELEPHONE ENCOUNTER
All right please call the patient up and let her know since omeprazole 20 mg was not helping that I refilled 90 days of omeprazole 40 mg and please establish care with new provider to continue getting refills if you want to stick with 40 mg and if you continue needing 40 mg of omeprazole and are unable to go down to 20 mg then you need to consider may be seeing a gastroenterologist for possible upper GI scope and/or evaluation in the future.  Please establish care with a new provider as my last day at [facility] will be February 27.  Thanks.

## 2020-02-25 ENCOUNTER — OFFICE VISIT (OUTPATIENT)
Dept: URGENT CARE | Facility: PHYSICIAN GROUP | Age: 40
End: 2020-02-25
Payer: COMMERCIAL

## 2020-02-25 ENCOUNTER — HOSPITAL ENCOUNTER (OUTPATIENT)
Facility: MEDICAL CENTER | Age: 40
End: 2020-02-25
Attending: FAMILY MEDICINE
Payer: COMMERCIAL

## 2020-02-25 VITALS
WEIGHT: 206 LBS | DIASTOLIC BLOOD PRESSURE: 92 MMHG | TEMPERATURE: 99.2 F | RESPIRATION RATE: 18 BRPM | HEART RATE: 94 BPM | BODY MASS INDEX: 33.11 KG/M2 | OXYGEN SATURATION: 98 % | SYSTOLIC BLOOD PRESSURE: 126 MMHG | HEIGHT: 66 IN

## 2020-02-25 DIAGNOSIS — R19.7 DIARRHEA, UNSPECIFIED TYPE: ICD-10-CM

## 2020-02-25 PROCEDURE — 87046 STOOL CULTR AEROBIC BACT EA: CPT

## 2020-02-25 PROCEDURE — 87899 AGENT NOS ASSAY W/OPTIC: CPT

## 2020-02-25 PROCEDURE — 87045 FECES CULTURE AEROBIC BACT: CPT

## 2020-02-25 PROCEDURE — 99214 OFFICE O/P EST MOD 30 MIN: CPT | Performed by: FAMILY MEDICINE

## 2020-02-25 PROCEDURE — 87329 GIARDIA AG IA: CPT

## 2020-02-25 PROCEDURE — 87328 CRYPTOSPORIDIUM AG IA: CPT

## 2020-02-25 NOTE — PROGRESS NOTES
"Subjective:      Татьяна Velez is a 39 y.o. female who presents with Diarrhea (fatigue, fever, bodyaches, headache x3days)            39-year-old female otherwise healthy presenting for evaluation of diarrhea for the past 3 days.  She had about 10-15 loose stools daily.  So far she had 5.  Denies any melena or hematochezia.  Had some fever and body aches last week including headache which went away.  No travel history or exposure to other ill contacts   She has had some oral antibiotic within the past 4+ weeks.  Has been using brat diet.  Has also been hydrating well.  She denies any history of colitis or inflammatory bowels.        Review of Systems   All other systems reviewed and are negative.         Objective:     /92 (BP Location: Left arm, Patient Position: Sitting, BP Cuff Size: Large adult)   Pulse 94   Temp 37.3 °C (99.2 °F) (Temporal)   Resp 18   Ht 1.676 m (5' 6\")   Wt 93.4 kg (206 lb)   SpO2 98%   BMI 33.25 kg/m²      Physical Exam  Constitutional:       General: She is not in acute distress.     Appearance: She is not ill-appearing, toxic-appearing or diaphoretic.   HENT:      Head: Normocephalic and atraumatic.      Right Ear: External ear normal.      Left Ear: External ear normal.      Nose: Nose normal. No rhinorrhea.      Mouth/Throat:      Mouth: Mucous membranes are moist.      Pharynx: Oropharynx is clear. No oropharyngeal exudate or posterior oropharyngeal erythema.   Eyes:      General: No scleral icterus.     Conjunctiva/sclera: Conjunctivae normal.   Cardiovascular:      Rate and Rhythm: Normal rate and regular rhythm.      Heart sounds: No murmur. No friction rub. No gallop.    Pulmonary:      Effort: Pulmonary effort is normal. No respiratory distress.      Breath sounds: No stridor. No wheezing, rhonchi or rales.   Abdominal:      General: Bowel sounds are normal. There is no distension.      Palpations: Abdomen is soft. There is no mass.      Tenderness: There " is abdominal tenderness (Mild generalized tenderness). There is no guarding or rebound.      Hernia: No hernia is present.   Genitourinary:     Vagina: Vaginal discharge: .we.   Skin:     General: Skin is warm.      Coloration: Skin is not jaundiced or pale.      Findings: No erythema or rash.   Neurological:      Mental Status: She is alert and oriented to person, place, and time.   Psychiatric:         Mood and Affect: Mood normal.                 Assessment/Plan:       1. Diarrhea, unspecified type  - C DIFFICILE TOXINS A+B, EIA  - CULTURE STOOL; Future  - CRYPTO/GIARDIA RAPID ASSAY; Future      Continue symptomatic treatment.  Drop of stool sample as soon as she can so we can send it for report and treated accordingly.  Plan per orders and instructions  Warning signs reviewed  Work/School Excuse given

## 2020-02-25 NOTE — LETTER
February 25, 2020         Patient: Татьяна Velez   YOB: 1980   Date of Visit: 2/25/2020           To Whom it May Concern:    Татьяна Velez was seen in my clinic on 2/25/2020. She may return to work on 02/28/20..    If you have any questions or concerns, please don't hesitate to call.        Sincerely,           Cyrus Baarhona M.D.  Electronically Signed

## 2020-02-26 LAB
G LAMBLIA+C PARVUM AG STL QL RAPID: NORMAL
SIGNIFICANT IND 70042: NORMAL
SITE SITE: NORMAL
SOURCE SOURCE: NORMAL

## 2020-02-27 LAB
E COLI SXT1+2 STL IA: NORMAL
SIGNIFICANT IND 70042: NORMAL
SITE SITE: NORMAL
SOURCE SOURCE: NORMAL

## 2020-02-29 LAB
BACTERIA STL CULT: NORMAL
E COLI SXT1+2 STL IA: NORMAL
SIGNIFICANT IND 70042: NORMAL
SITE SITE: NORMAL
SOURCE SOURCE: NORMAL

## 2020-05-14 ENCOUNTER — TELEMEDICINE (OUTPATIENT)
Dept: MEDICAL GROUP | Facility: PHYSICIAN GROUP | Age: 40
End: 2020-05-14
Payer: COMMERCIAL

## 2020-05-14 VITALS — WEIGHT: 197 LBS | HEIGHT: 66 IN | BODY MASS INDEX: 31.66 KG/M2

## 2020-05-14 DIAGNOSIS — I10 ESSENTIAL HYPERTENSION: Chronic | ICD-10-CM

## 2020-05-14 DIAGNOSIS — E04.1 LEFT THYROID NODULE: Chronic | ICD-10-CM

## 2020-05-14 DIAGNOSIS — E55.9 VITAMIN D DEFICIENCY: Chronic | ICD-10-CM

## 2020-05-14 DIAGNOSIS — E04.1 THYROID NODULE: ICD-10-CM

## 2020-05-14 DIAGNOSIS — E53.8 VITAMIN B12 DEFICIENCY: Chronic | ICD-10-CM

## 2020-05-14 PROBLEM — M54.2 NECK PAIN: Status: RESOLVED | Noted: 2019-04-30 | Resolved: 2020-05-14

## 2020-05-14 PROBLEM — E04.9 GOITER: Status: RESOLVED | Noted: 2019-04-30 | Resolved: 2020-05-14

## 2020-05-14 PROBLEM — F32.A MILD DEPRESSION: Chronic | Status: ACTIVE | Noted: 2019-03-19

## 2020-05-14 PROCEDURE — 99204 OFFICE O/P NEW MOD 45 MIN: CPT | Mod: 95,CR | Performed by: INTERNAL MEDICINE

## 2020-05-14 RX ORDER — DOXYCYCLINE HYCLATE 100 MG/1
CAPSULE ORAL
COMMUNITY
Start: 2020-02-20 | End: 2020-05-14 | Stop reason: CLARIF

## 2020-05-14 RX ORDER — CHOLECALCIFEROL (VITAMIN D3) 10(400)/ML
DROPS ORAL
COMMUNITY
End: 2020-05-14 | Stop reason: CLARIF

## 2020-05-14 RX ORDER — AZELASTINE HYDROCHLORIDE 137 UG/1
SPRAY, METERED NASAL
COMMUNITY
Start: 2020-01-13 | End: 2020-05-14

## 2020-05-14 RX ORDER — ALPRAZOLAM 1 MG/1
TABLET ORAL
COMMUNITY
Start: 2020-02-13 | End: 2020-05-14 | Stop reason: CLARIF

## 2020-05-14 RX ORDER — HYDROCHLOROTHIAZIDE 12.5 MG/1
CAPSULE, GELATIN COATED ORAL
COMMUNITY
End: 2020-05-14

## 2020-05-14 RX ORDER — IRON,CARBONYL/ASCORBIC ACID 100-250 MG
TABLET ORAL
COMMUNITY
End: 2020-05-14 | Stop reason: CLARIF

## 2020-05-14 RX ORDER — OMEPRAZOLE 10 MG/1
CAPSULE, DELAYED RELEASE ORAL
COMMUNITY
End: 2020-05-14 | Stop reason: CLARIF

## 2020-05-14 RX ORDER — MONTELUKAST SODIUM 10 MG/1
TABLET ORAL
COMMUNITY
End: 2020-05-14 | Stop reason: CLARIF

## 2020-05-14 RX ORDER — FLUTICASONE PROPIONATE 50 MCG
SPRAY, SUSPENSION (ML) NASAL
COMMUNITY
End: 2021-05-11 | Stop reason: SDUPTHER

## 2020-05-14 ASSESSMENT — FIBROSIS 4 INDEX: FIB4 SCORE: 0.5

## 2020-05-14 NOTE — ASSESSMENT & PLAN NOTE
This condition was noted June 2019.  No biopsy was done.  Patient is due for follow-up ultrasound which was ordered.  Lab tests also requested.  She is advised to follow-up after these tests are completed.

## 2020-05-14 NOTE — ASSESSMENT & PLAN NOTE
This is a chronic condition, stable.  Patient is currently taking hydrochlorothiazide 25 mg daily.  Patient reported that her blood pressure has been well controlled at home.

## 2020-05-14 NOTE — PROGRESS NOTES
This encounter was conducted via Zoom Video Virtual Visit.  Verbal consent was obtained. Patient's identity was verified.  -------------------------------------------------------------------------------    CC:  Establish care  Follow-up thyroid nodule    HPI: This is a 40 y.o. Pt presents to establish care.   Pt's medical history is notable for:    Essential hypertension  This is a chronic condition, stable.  Patient is currently taking hydrochlorothiazide 25 mg daily.  Patient reported that her blood pressure has been well controlled at home.    Vitamin D deficiency  Patient is currently taking vitamin D.  She is due for lab work.    Vitamin B12 deficiency  Patient is due for lab work.    Left thyroid nodule  This condition was noted June 2019.  No biopsy was done.  Patient is due for follow-up ultrasound which was ordered.  Lab tests also requested.  She is advised to follow-up after these tests are completed.           REVIEW OF SYSTEMS:     Constitutional:  no fever / chills   Neurologic: no headaches, no numbness/tingling  Eyes: no changes in vision  ENT: no sore throat, no hearing loss  CV:  no chest pain, no palpitations  Pulmonary: no SOB, no cough    GI: no nausea / vomiting, no diarrhea, no constipation, no rectal bleeding   :  no dysuria, no hematuria   Skin: no rash     All other systems reviewed and are negative.    Allergies: Sulfa drugs    Current Outpatient Medications Ordered in Epic   Medication Sig Dispense Refill   • fluticasone (FLONASE) 50 MCG/ACT nasal spray      • montelukast (SINGULAIR) 10 MG Tab Take 1 Tab by mouth every day. 90 Tab 3   • omeprazole (PRILOSEC) 20 MG delayed-release capsule Take 1 Cap by mouth every day. 90 Cap 1   • hydroCHLOROthiazide (HYDRODIURIL) 25 MG Tab Take 1 Tab by mouth every day. 90 Tab 2   • ferrous sulfate 325 (65 Fe) MG tablet Take 325 mg by mouth every day.     • cyanocobalamin (VITAMIN B-12) 500 MCG Tab Take 500 mcg by mouth every day.     • ergocalciferol  (DRISDOL) 11475 UNIT capsule Take one tab po once a week. 12 Cap 1     No current Epic-ordered facility-administered medications on file.        Past Medical History:   Diagnosis Date   • Bronchitis    • Chickenpox    • GERD (gastroesophageal reflux disease)    • Goiter 4/30/2019   • Hypertension    • Melanoma (HCC)    • Migraine    • Mild depression (HCC) 3/19/2019   • Nasal drainage    • Tonsillitis    • Venereal disease         Past Surgical History:   Procedure Laterality Date   • SKIN RESURFACING      removal melanoma as kid        Family History   Problem Relation Age of Onset   • Heart Disease Maternal Grandmother    • Heart Disease Paternal Grandfather         heart attack   • Hypertension Mother    • Diabetes Mother    • Obesity Mother    • Hypertension Father    • Obesity Father    • Sleep Apnea Father    • Alcohol/Drug Maternal Grandfather    • Lung Disease Paternal Grandmother    • Heart Disease Paternal Grandmother         many heart attacks        Social History     Tobacco Use   Smoking Status Never Smoker   Smokeless Tobacco Never Used          Social History     Substance and Sexual Activity   Alcohol Use No        ---------------------------------------------------------------------  There were no vitals filed for this visit.    PHYSICAL EXAM:   Psych:  A&O x 3, mood and affect appropriate  Constitutional: no distress  Skin: No apparent rashes  Eye: Conjunctiva clear, no icterus  ENMT: Lips without lesions. Phonation normal.  Neck: No obvious masses visible, no thyromegaly.   Respiratory: Unlabored respiratory effort    ---------------------------------------------------------------------    ASSESSMENT and PLAN:  1. Thyroid nodule  Thyroid ultrasound and lab test requested.  Advised the patient to follow-up after the work-up is completed.    2. Essential hypertension  Chronic stable condition.  Continue with current management hydrochlorothiazide.  Advised the patient to continue to monitor blood  pressure regularly at home.    4. Vitamin D deficiency  Chronic stable condition.  Lab tests ordered.    5. Vitamin B12 deficiency  Chronic stable condition.  Lab tests ordered.          Return in about 4 months (around 9/14/2020) for high blood pressure followup.     PATIENT EDUCATION:  -If any problems should arise, patient was advised to contact our office or go to ER to be evaluated.  -Advised pt to follow a healthy diet and regular aerobic exercise regimen. Advised pt to avoid alcohol and tobacco use.    Please note that this dictation was created using voice recognition software. I have made every reasonable attempt to correct obvious errors, but it is possible there are errors of grammar and possibly content that I did not discover before finalizing the note.

## 2020-10-07 DIAGNOSIS — K21.9 GASTROESOPHAGEAL REFLUX DISEASE WITHOUT ESOPHAGITIS: ICD-10-CM

## 2020-10-07 RX ORDER — OMEPRAZOLE 20 MG/1
CAPSULE, DELAYED RELEASE ORAL
Qty: 90 CAP | Refills: 1 | Status: SHIPPED | OUTPATIENT
Start: 2020-10-07 | End: 2021-03-11

## 2020-10-25 DIAGNOSIS — I10 ESSENTIAL HYPERTENSION: ICD-10-CM

## 2020-10-27 RX ORDER — HYDROCHLOROTHIAZIDE 25 MG/1
TABLET ORAL
Qty: 90 TAB | Refills: 1 | Status: SHIPPED | OUTPATIENT
Start: 2020-10-27 | End: 2021-03-11

## 2020-10-27 NOTE — TELEPHONE ENCOUNTER
Refill X 6 months, sent to pharmacy.Pt. Seen in the last 6 months per protocol.   Lab Results   Component Value Date/Time    SODIUM 138 01/03/2020 12:12 PM    POTASSIUM 3.5 (L) 01/03/2020 12:12 PM    CHLORIDE 101 01/03/2020 12:12 PM    CO2 28 01/03/2020 12:12 PM    GLUCOSE 81 01/03/2020 12:12 PM    BUN 14 01/03/2020 12:12 PM    CREATININE 0.82 01/03/2020 12:12 PM

## 2021-03-09 DIAGNOSIS — J30.1 SEASONAL ALLERGIC RHINITIS DUE TO POLLEN: ICD-10-CM

## 2021-03-09 DIAGNOSIS — K21.9 GASTROESOPHAGEAL REFLUX DISEASE WITHOUT ESOPHAGITIS: ICD-10-CM

## 2021-03-09 DIAGNOSIS — I10 ESSENTIAL HYPERTENSION: ICD-10-CM

## 2021-03-11 RX ORDER — OMEPRAZOLE 20 MG/1
CAPSULE, DELAYED RELEASE ORAL
Qty: 90 CAPSULE | Refills: 0 | Status: SHIPPED | OUTPATIENT
Start: 2021-03-11 | End: 2021-05-11

## 2021-03-11 RX ORDER — HYDROCHLOROTHIAZIDE 25 MG/1
TABLET ORAL
Qty: 90 TABLET | Refills: 0 | Status: SHIPPED | OUTPATIENT
Start: 2021-03-11 | End: 2021-05-11 | Stop reason: SDUPTHER

## 2021-03-11 RX ORDER — MONTELUKAST SODIUM 10 MG/1
TABLET ORAL
Qty: 90 TABLET | Refills: 0 | Status: SHIPPED | OUTPATIENT
Start: 2021-03-11 | End: 2021-05-11 | Stop reason: SDUPTHER

## 2021-05-07 DIAGNOSIS — K21.9 GASTROESOPHAGEAL REFLUX DISEASE WITHOUT ESOPHAGITIS: ICD-10-CM

## 2021-05-11 ENCOUNTER — OFFICE VISIT (OUTPATIENT)
Dept: MEDICAL GROUP | Facility: PHYSICIAN GROUP | Age: 41
End: 2021-05-11
Payer: COMMERCIAL

## 2021-05-11 VITALS
OXYGEN SATURATION: 99 % | SYSTOLIC BLOOD PRESSURE: 118 MMHG | TEMPERATURE: 98 F | HEART RATE: 75 BPM | DIASTOLIC BLOOD PRESSURE: 74 MMHG | RESPIRATION RATE: 16 BRPM | BODY MASS INDEX: 32.65 KG/M2 | WEIGHT: 196 LBS | HEIGHT: 65 IN

## 2021-05-11 DIAGNOSIS — Z12.31 ENCOUNTER FOR SCREENING MAMMOGRAM FOR MALIGNANT NEOPLASM OF BREAST: ICD-10-CM

## 2021-05-11 DIAGNOSIS — E55.9 VITAMIN D DEFICIENCY: ICD-10-CM

## 2021-05-11 DIAGNOSIS — E78.5 DYSLIPIDEMIA: Chronic | ICD-10-CM

## 2021-05-11 DIAGNOSIS — K21.9 GASTROESOPHAGEAL REFLUX DISEASE WITHOUT ESOPHAGITIS: Chronic | ICD-10-CM

## 2021-05-11 DIAGNOSIS — Z00.00 WELL ADULT EXAM: ICD-10-CM

## 2021-05-11 DIAGNOSIS — Z23 NEED FOR VACCINATION: ICD-10-CM

## 2021-05-11 DIAGNOSIS — I10 ESSENTIAL HYPERTENSION: Chronic | ICD-10-CM

## 2021-05-11 DIAGNOSIS — E04.1 LEFT THYROID NODULE: ICD-10-CM

## 2021-05-11 DIAGNOSIS — E53.8 VITAMIN B12 DEFICIENCY: ICD-10-CM

## 2021-05-11 PROCEDURE — 99214 OFFICE O/P EST MOD 30 MIN: CPT | Mod: 25 | Performed by: INTERNAL MEDICINE

## 2021-05-11 PROCEDURE — 90746 HEPB VACCINE 3 DOSE ADULT IM: CPT | Performed by: INTERNAL MEDICINE

## 2021-05-11 PROCEDURE — 90471 IMMUNIZATION ADMIN: CPT | Performed by: INTERNAL MEDICINE

## 2021-05-11 RX ORDER — HYDROCHLOROTHIAZIDE 25 MG/1
25 TABLET ORAL
Qty: 90 TABLET | Refills: 3 | Status: SHIPPED | OUTPATIENT
Start: 2021-05-11 | End: 2021-11-11 | Stop reason: SDUPTHER

## 2021-05-11 RX ORDER — OMEPRAZOLE 20 MG/1
CAPSULE, DELAYED RELEASE ORAL
Qty: 90 CAPSULE | Refills: 0 | Status: SHIPPED | OUTPATIENT
Start: 2021-05-11 | End: 2021-05-11 | Stop reason: SDUPTHER

## 2021-05-11 RX ORDER — FLUTICASONE PROPIONATE 50 MCG
1 SPRAY, SUSPENSION (ML) NASAL DAILY
Qty: 16 G | Refills: 5 | Status: SHIPPED | OUTPATIENT
Start: 2021-05-11 | End: 2021-11-11 | Stop reason: SDUPTHER

## 2021-05-11 RX ORDER — MONTELUKAST SODIUM 10 MG/1
10 TABLET ORAL
Qty: 90 TABLET | Refills: 3 | Status: SHIPPED | OUTPATIENT
Start: 2021-05-11 | End: 2021-11-11 | Stop reason: SDUPTHER

## 2021-05-11 RX ORDER — OMEPRAZOLE 20 MG/1
20 CAPSULE, DELAYED RELEASE ORAL
Qty: 90 CAPSULE | Refills: 3 | Status: SHIPPED | OUTPATIENT
Start: 2021-05-11 | End: 2021-11-11 | Stop reason: SDUPTHER

## 2021-05-11 ASSESSMENT — FIBROSIS 4 INDEX: FIB4 SCORE: 0.52

## 2021-05-11 NOTE — ASSESSMENT & PLAN NOTE
Chronic condition per the patient takes omeprazole.  Patient denies nausea vomiting dysphagia or unexplained weight loss.

## 2021-05-11 NOTE — ASSESSMENT & PLAN NOTE
Chronic condition.  The patient takes hydrochlorothiazide daily.  No significant side effects reported.

## 2021-05-11 NOTE — PROGRESS NOTES
CC: Follow-up hypertension and thyroid nodule      HPI: This is a 41 y.o. pt.  Pt's medical history is notable for:     Essential hypertension  Chronic condition.  The patient takes hydrochlorothiazide daily.  No significant side effects reported.    Left thyroid nodule  Previous ultrasound 2019 showed  Single 1 cm left thyroid nodule.       Recommend thyroid ultrasound to be done for follow-up along with blood test.    Vitamin D deficiency  Patient currently taking vitamin D supplementation.  Lab tests ordered for follow-up.    Dyslipidemia  Chronic condition.  Patient on diet therapy.  Patient is due for follow-up blood test    Gastroesophageal reflux disease without esophagitis  Chronic condition per the patient takes omeprazole.  Patient denies nausea vomiting dysphagia or unexplained weight loss.          REVIEW OF SYSTEMS:     Constitutional:  no fever / chills   Neurologic: no headaches  Eyes: no changes in vision  ENT: no sore throat, no hearing loss  CV:  no chest pain, no palpitations  Pulmonary: no SOB, no cough          Allergies: Sulfa drugs    Current Outpatient Medications Ordered in Epic   Medication Sig Dispense Refill   • omeprazole (PRILOSEC) 20 MG delayed-release capsule Take 1 capsule by mouth every day. 90 capsule 3   • hydroCHLOROthiazide (HYDRODIURIL) 25 MG Tab Take 1 tablet by mouth every day. 90 tablet 3   • montelukast (SINGULAIR) 10 MG Tab Take 1 tablet by mouth every day. 90 tablet 3   • fluticasone (FLONASE) 50 MCG/ACT nasal spray Administer 1 Spray into affected nostril(S) every day. 16 g 5   • ferrous sulfate 325 (65 Fe) MG tablet Take 325 mg by mouth every day.       No current [facility]-ordered facility-administered medications on file.       Past Medical, Social, and Family history reviewed and updated in EPIC     ------------------------------------------------------------------------------     PHYSICAL EXAM:   Vitals:    05/11/21 1630   BP: 118/74   Pulse: 75   Resp: 16   Temp: 36.7  °C (98 °F)   SpO2: 99%      Body mass index is 32.62 kg/m².         Constitutional: no acute distress  CV: heart RRR  Resp: normal effort, no wheezing or rales.  GI: abdomen soft, no obvious mass, no tenderness  Neuro: CN 2-12 grossly intact        -----------------------------------------------------------------------------    ASSESSMENT:   1. Essential hypertension     2. Vitamin D deficiency  VITAMIN D,25 HYDROXY   3. Vitamin B12 deficiency  VITAMIN B12   4. Left thyroid nodule  TSH    FREE THYROXINE    TRIIDOTHYRONINE    US-THYROID   5. Well adult exam  Basic Metabolic Panel    CBC WITHOUT DIFFERENTIAL    MICROALBUMIN CREAT RATIO URINE   6. Dyslipidemia  ALANINE AMINO-TRANS    Lipid Profile   7. Gastroesophageal reflux disease without esophagitis     8. Encounter for screening mammogram for malignant neoplasm of breast  MA-SCREENING MAMMO BILAT W/TOMOSYNTHESIS W/CAD   9. Need for vaccination  Hepatitis B Vaccine Adult IM           MEDICAL DECISION MAKING: DISCUSSION / STATUS / PLAN:    Medically patient appears stable  Blood pressure is well controlled  Recommend the patient continue with current medications  Lab tests ordered as above.  Thyroid ultrasound requested.  Advised the patient to follow-up after the above studies completed.  Patient reported that she has lost approximately 30 pounds with diet and exercise.       Return in about 6 months (around 11/11/2021).     -If any problems should arise, patient was advised to contact our office or go to ER to be evaluated.    Please note that this dictation was created using voice recognition software. I have made every reasonable attempt to correct obvious errors, but it is possible there are errors of grammar and possibly content that I did not discover before finalizing the note.

## 2021-05-11 NOTE — ASSESSMENT & PLAN NOTE
Previous ultrasound 2019 showed  Single 1 cm left thyroid nodule.       Recommend thyroid ultrasound to be done for follow-up along with blood test.

## 2021-06-04 ENCOUNTER — HOSPITAL ENCOUNTER (OUTPATIENT)
Dept: LAB | Facility: MEDICAL CENTER | Age: 41
End: 2021-06-04
Attending: INTERNAL MEDICINE
Payer: COMMERCIAL

## 2021-06-04 DIAGNOSIS — E78.5 DYSLIPIDEMIA: Chronic | ICD-10-CM

## 2021-06-04 DIAGNOSIS — E53.8 VITAMIN B12 DEFICIENCY: ICD-10-CM

## 2021-06-04 DIAGNOSIS — Z00.00 WELL ADULT EXAM: ICD-10-CM

## 2021-06-04 DIAGNOSIS — E04.1 LEFT THYROID NODULE: ICD-10-CM

## 2021-06-04 DIAGNOSIS — E55.9 VITAMIN D DEFICIENCY: ICD-10-CM

## 2021-06-04 LAB
ALT SERPL-CCNC: 15 U/L (ref 2–50)
ANION GAP SERPL CALC-SCNC: 10 MMOL/L (ref 7–16)
BUN SERPL-MCNC: 16 MG/DL (ref 8–22)
CALCIUM SERPL-MCNC: 8.9 MG/DL (ref 8.5–10.5)
CHLORIDE SERPL-SCNC: 107 MMOL/L (ref 96–112)
CHOLEST SERPL-MCNC: 177 MG/DL (ref 100–199)
CO2 SERPL-SCNC: 22 MMOL/L (ref 20–33)
CREAT SERPL-MCNC: 0.63 MG/DL (ref 0.5–1.4)
CREAT UR-MCNC: 48.01 MG/DL
ERYTHROCYTE [DISTWIDTH] IN BLOOD BY AUTOMATED COUNT: 47.1 FL (ref 35.9–50)
FASTING STATUS PATIENT QL REPORTED: NORMAL
GLUCOSE SERPL-MCNC: 86 MG/DL (ref 65–99)
HCT VFR BLD AUTO: 47.8 % (ref 37–47)
HDLC SERPL-MCNC: 38 MG/DL
HGB BLD-MCNC: 14.8 G/DL (ref 12–16)
LDLC SERPL CALC-MCNC: 122 MG/DL
MCH RBC QN AUTO: 27.2 PG (ref 27–33)
MCHC RBC AUTO-ENTMCNC: 31 G/DL (ref 33.6–35)
MCV RBC AUTO: 87.7 FL (ref 81.4–97.8)
MICROALBUMIN UR-MCNC: <1.2 MG/DL
MICROALBUMIN/CREAT UR: NORMAL MG/G (ref 0–30)
PLATELET # BLD AUTO: 309 K/UL (ref 164–446)
PMV BLD AUTO: 9.9 FL (ref 9–12.9)
POTASSIUM SERPL-SCNC: 4.2 MMOL/L (ref 3.6–5.5)
RBC # BLD AUTO: 5.45 M/UL (ref 4.2–5.4)
SODIUM SERPL-SCNC: 139 MMOL/L (ref 135–145)
T3 SERPL-MCNC: 130 NG/DL (ref 60–181)
T4 FREE SERPL-MCNC: 1.13 NG/DL (ref 0.93–1.7)
TRIGL SERPL-MCNC: 84 MG/DL (ref 0–149)
TSH SERPL DL<=0.005 MIU/L-ACNC: 0.98 UIU/ML (ref 0.38–5.33)
VIT B12 SERPL-MCNC: 506 PG/ML (ref 211–911)
WBC # BLD AUTO: 6.4 K/UL (ref 4.8–10.8)

## 2021-06-04 PROCEDURE — 84443 ASSAY THYROID STIM HORMONE: CPT

## 2021-06-04 PROCEDURE — 82607 VITAMIN B-12: CPT

## 2021-06-04 PROCEDURE — 82306 VITAMIN D 25 HYDROXY: CPT

## 2021-06-04 PROCEDURE — 82043 UR ALBUMIN QUANTITATIVE: CPT

## 2021-06-04 PROCEDURE — 80061 LIPID PANEL: CPT

## 2021-06-04 PROCEDURE — 85027 COMPLETE CBC AUTOMATED: CPT

## 2021-06-04 PROCEDURE — 84480 ASSAY TRIIODOTHYRONINE (T3): CPT

## 2021-06-04 PROCEDURE — 84439 ASSAY OF FREE THYROXINE: CPT

## 2021-06-04 PROCEDURE — 36415 COLL VENOUS BLD VENIPUNCTURE: CPT

## 2021-06-04 PROCEDURE — 82570 ASSAY OF URINE CREATININE: CPT

## 2021-06-04 PROCEDURE — 84460 ALANINE AMINO (ALT) (SGPT): CPT

## 2021-06-04 PROCEDURE — 80048 BASIC METABOLIC PNL TOTAL CA: CPT

## 2021-06-07 LAB — 25(OH)D3 SERPL-MCNC: 36 NG/ML (ref 30–80)

## 2021-06-10 ENCOUNTER — HOSPITAL ENCOUNTER (OUTPATIENT)
Dept: RADIOLOGY | Facility: MEDICAL CENTER | Age: 41
End: 2021-06-10
Attending: INTERNAL MEDICINE
Payer: COMMERCIAL

## 2021-06-10 DIAGNOSIS — E04.1 LEFT THYROID NODULE: ICD-10-CM

## 2021-06-10 PROCEDURE — 76536 US EXAM OF HEAD AND NECK: CPT

## 2021-07-12 ENCOUNTER — HOSPITAL ENCOUNTER (OUTPATIENT)
Dept: RADIOLOGY | Facility: MEDICAL CENTER | Age: 41
End: 2021-07-12
Attending: INTERNAL MEDICINE
Payer: COMMERCIAL

## 2021-07-12 DIAGNOSIS — Z12.31 ENCOUNTER FOR SCREENING MAMMOGRAM FOR MALIGNANT NEOPLASM OF BREAST: ICD-10-CM

## 2021-07-12 PROCEDURE — 77063 BREAST TOMOSYNTHESIS BI: CPT

## 2021-07-14 ENCOUNTER — APPOINTMENT (RX ONLY)
Dept: URBAN - METROPOLITAN AREA CLINIC 31 | Facility: CLINIC | Age: 41
Setting detail: DERMATOLOGY
End: 2021-07-14

## 2021-07-14 DIAGNOSIS — L82.1 OTHER SEBORRHEIC KERATOSIS: ICD-10-CM

## 2021-07-14 DIAGNOSIS — D22 MELANOCYTIC NEVI: ICD-10-CM

## 2021-07-14 DIAGNOSIS — L81.4 OTHER MELANIN HYPERPIGMENTATION: ICD-10-CM

## 2021-07-14 DIAGNOSIS — L70.0 ACNE VULGARIS: ICD-10-CM

## 2021-07-14 DIAGNOSIS — D18.0 HEMANGIOMA: ICD-10-CM

## 2021-07-14 PROBLEM — D18.01 HEMANGIOMA OF SKIN AND SUBCUTANEOUS TISSUE: Status: ACTIVE | Noted: 2021-07-14

## 2021-07-14 PROBLEM — D22.61 MELANOCYTIC NEVI OF RIGHT UPPER LIMB, INCLUDING SHOULDER: Status: ACTIVE | Noted: 2021-07-14

## 2021-07-14 PROBLEM — D22.62 MELANOCYTIC NEVI OF LEFT UPPER LIMB, INCLUDING SHOULDER: Status: ACTIVE | Noted: 2021-07-14

## 2021-07-14 PROBLEM — D22.5 MELANOCYTIC NEVI OF TRUNK: Status: ACTIVE | Noted: 2021-07-14

## 2021-07-14 PROBLEM — D22.9 MELANOCYTIC NEVI, UNSPECIFIED: Status: ACTIVE | Noted: 2021-07-14

## 2021-07-14 PROCEDURE — ? COUNSELING

## 2021-07-14 PROCEDURE — ? ADDITIONAL NOTES

## 2021-07-14 PROCEDURE — 99214 OFFICE O/P EST MOD 30 MIN: CPT

## 2021-07-14 PROCEDURE — ? PRESCRIPTION

## 2021-07-14 RX ORDER — TRETIONIN 1 MG/G
1 CREAM TOPICAL QD
Qty: 45 | Refills: 6 | Status: ERX | COMMUNITY
Start: 2021-07-14

## 2021-07-14 RX ORDER — CLINDAMYCIN PHOSPHATE AND BENZOYL PEROXIDE 1 %-5 %
1 KIT TOPICAL QD
Qty: 1 | Refills: 6 | Status: ERX | COMMUNITY
Start: 2021-07-14

## 2021-07-14 RX ADMIN — TRETIONIN 1: 1 CREAM TOPICAL at 00:00

## 2021-07-14 RX ADMIN — CLINDAMYCIN PHOSPHATE AND BENZOYL PEROXIDE 1: KIT at 00:00

## 2021-07-14 ASSESSMENT — LOCATION ZONE DERM
LOCATION ZONE: ARM
LOCATION ZONE: FACE
LOCATION ZONE: TRUNK

## 2021-07-14 ASSESSMENT — LOCATION DETAILED DESCRIPTION DERM
LOCATION DETAILED: RIGHT MEDIAL UPPER BACK
LOCATION DETAILED: RIGHT BUTTOCK
LOCATION DETAILED: RIGHT CENTRAL MANDIBULAR CHEEK
LOCATION DETAILED: LEFT CENTRAL MANDIBULAR CHEEK
LOCATION DETAILED: LEFT BUTTOCK
LOCATION DETAILED: LEFT SUPERIOR MEDIAL UPPER BACK
LOCATION DETAILED: RIGHT PROXIMAL DORSAL FOREARM
LOCATION DETAILED: LEFT INFERIOR UPPER BACK
LOCATION DETAILED: LEFT PROXIMAL DORSAL FOREARM
LOCATION DETAILED: RIGHT DISTAL POSTERIOR UPPER ARM
LOCATION DETAILED: LEFT ELBOW
LOCATION DETAILED: RIGHT ELBOW
LOCATION DETAILED: LEFT PERIAREOLAR BREAST 12-1:00 REGION
LOCATION DETAILED: LEFT DISTAL POSTERIOR UPPER ARM

## 2021-07-14 ASSESSMENT — LOCATION SIMPLE DESCRIPTION DERM
LOCATION SIMPLE: RIGHT CHEEK
LOCATION SIMPLE: LEFT POSTERIOR UPPER ARM
LOCATION SIMPLE: LEFT BUTTOCK
LOCATION SIMPLE: RIGHT FOREARM
LOCATION SIMPLE: RIGHT BUTTOCK
LOCATION SIMPLE: RIGHT POSTERIOR UPPER ARM
LOCATION SIMPLE: LEFT UPPER BACK
LOCATION SIMPLE: RIGHT UPPER BACK
LOCATION SIMPLE: LEFT ELBOW
LOCATION SIMPLE: LEFT BREAST
LOCATION SIMPLE: RIGHT ELBOW
LOCATION SIMPLE: LEFT CHEEK
LOCATION SIMPLE: LEFT FOREARM

## 2021-07-14 NOTE — PROCEDURE: ADDITIONAL NOTES
Additional Notes: Recommended to see Obgyn to discuss ortho tricyclen and spironolactone.
Render Risk Assessment In Note?: no
Detail Level: Simple
Additional Notes: NER

## 2021-07-14 NOTE — PROCEDURE: COUNSELING
Azithromycin Pregnancy And Lactation Text: This medication is considered safe during pregnancy and is also secreted in breast milk.
Topical Clindamycin Pregnancy And Lactation Text: This medication is Pregnancy Category B and is considered safe during pregnancy. It is unknown if it is excreted in breast milk.
Topical Retinoid Pregnancy And Lactation Text: This medication is Pregnancy Category C. It is unknown if this medication is excreted in breast milk.
High Dose Vitamin A Counseling: Side effects reviewed, pt to contact office should one occur.
Erythromycin Counseling:  I discussed with the patient the risks of erythromycin including but not limited to GI upset, allergic reaction, drug rash, diarrhea, increase in liver enzymes, and yeast infections.
Use Enhanced Medication Counseling?: No
Sarecycline Pregnancy And Lactation Text: This medication is Pregnancy Category D and not consider safe during pregnancy. It is also excreted in breast milk.
Dapsone Counseling: I discussed with the patient the risks of dapsone including but not limited to hemolytic anemia, agranulocytosis, rashes, methemoglobinemia, kidney failure, peripheral neuropathy, headaches, GI upset, and liver toxicity.  Patients who start dapsone require monitoring including baseline LFTs and weekly CBCs for the first month, then every month thereafter.  The patient verbalized understanding of the proper use and possible adverse effects of dapsone.  All of the patient's questions and concerns were addressed.
High Dose Vitamin A Pregnancy And Lactation Text: High dose vitamin A therapy is contraindicated during pregnancy and breast feeding.
Bactrim Counseling:  I discussed with the patient the risks of sulfa antibiotics including but not limited to GI upset, allergic reaction, drug rash, diarrhea, dizziness, photosensitivity, and yeast infections.  Rarely, more serious reactions can occur including but not limited to aplastic anemia, agranulocytosis, methemoglobinemia, blood dyscrasias, liver or kidney failure, lung infiltrates or desquamative/blistering drug rashes.
Topical Sulfur Applications Counseling: Topical Sulfur Counseling: Patient counseled that this medication may cause skin irritation or allergic reactions.  In the event of skin irritation, the patient was advised to reduce the amount of the drug applied or use it less frequently.   The patient verbalized understanding of the proper use and possible adverse effects of topical sulfur application.  All of the patient's questions and concerns were addressed.
Detail Level: Detailed
Erythromycin Pregnancy And Lactation Text: This medication is Pregnancy Category B and is considered safe during pregnancy. It is also excreted in breast milk.
Tazorac Counseling:  Patient advised that medication is irritating and drying.  Patient may need to apply sparingly and wash off after an hour before eventually leaving it on overnight.  The patient verbalized understanding of the proper use and possible adverse effects of tazorac.  All of the patient's questions and concerns were addressed.
Dapsone Pregnancy And Lactation Text: This medication is Pregnancy Category C and is not considered safe during pregnancy or breast feeding.
Benzoyl Peroxide Counseling: Patient counseled that medicine may cause skin irritation and bleach clothing.  In the event of skin irritation, the patient was advised to reduce the amount of the drug applied or use it less frequently.   The patient verbalized understanding of the proper use and possible adverse effects of benzoyl peroxide.  All of the patient's questions and concerns were addressed.
Topical Sulfur Applications Pregnancy And Lactation Text: This medication is Pregnancy Category C and has an unknown safety profile during pregnancy. It is unknown if this topical medication is excreted in breast milk.
Bactrim Pregnancy And Lactation Text: This medication is Pregnancy Category D and is known to cause fetal risk.  It is also excreted in breast milk.
Spironolactone Counseling: Patient advised regarding risks of diarrhea, abdominal pain, hyperkalemia, birth defects (for female patients), liver toxicity and renal toxicity. The patient may need blood work to monitor liver and kidney function and potassium levels while on therapy. The patient verbalized understanding of the proper use and possible adverse effects of spironolactone.  All of the patient's questions and concerns were addressed.
Minocycline Counseling: Patient advised regarding possible photosensitivity and discoloration of the teeth, skin, lips, tongue and gums.  Patient instructed to avoid sunlight, if possible.  When exposed to sunlight, patients should wear protective clothing, sunglasses, and sunscreen.  The patient was instructed to call the office immediately if the following severe adverse effects occur:  hearing changes, easy bruising/bleeding, severe headache, or vision changes.  The patient verbalized understanding of the proper use and possible adverse effects of minocycline.  All of the patient's questions and concerns were addressed.
Tazorac Pregnancy And Lactation Text: This medication is not safe during pregnancy. It is unknown if this medication is excreted in breast milk.
Benzoyl Peroxide Pregnancy And Lactation Text: This medication is Pregnancy Category C. It is unknown if benzoyl peroxide is excreted in breast milk.
Isotretinoin Counseling: Patient should get monthly blood tests, not donate blood, not drive at night if vision affected, not share medication, and not undergo elective surgery for 6 months after tx completed. Side effects reviewed, pt to contact office should one occur.
Spironolactone Pregnancy And Lactation Text: This medication can cause feminization of the male fetus and should be avoided during pregnancy. The active metabolite is also found in breast milk.
Doxycycline Counseling:  Patient counseled regarding possible photosensitivity and increased risk for sunburn.  Patient instructed to avoid sunlight, if possible.  When exposed to sunlight, patients should wear protective clothing, sunglasses, and sunscreen.  The patient was instructed to call the office immediately if the following severe adverse effects occur:  hearing changes, easy bruising/bleeding, severe headache, or vision changes.  The patient verbalized understanding of the proper use and possible adverse effects of doxycycline.  All of the patient's questions and concerns were addressed.
Topical Clindamycin Counseling: Patient counseled that this medication may cause skin irritation or allergic reactions.  In the event of skin irritation, the patient was advised to reduce the amount of the drug applied or use it less frequently.   The patient verbalized understanding of the proper use and possible adverse effects of clindamycin.  All of the patient's questions and concerns were addressed.
Topical Retinoid counseling:  Patient advised to apply a pea-sized amount only at bedtime and wait 30 minutes after washing their face before applying.  If too drying, patient may add a non-comedogenic moisturizer. The patient verbalized understanding of the proper use and possible adverse effects of retinoids.  All of the patient's questions and concerns were addressed.
Birth Control Pills Counseling: Birth Control Pill Counseling: I discussed with the patient the potential side effects of OCPs including but not limited to increased risk of stroke, heart attack, thrombophlebitis, deep venous thrombosis, hepatic adenomas, breast changes, GI upset, headaches, and depression.  The patient verbalized understanding of the proper use and possible adverse effects of OCPs. All of the patient's questions and concerns were addressed.
Isotretinoin Pregnancy And Lactation Text: This medication is Pregnancy Category X and is considered extremely dangerous during pregnancy. It is unknown if it is excreted in breast milk.
Azithromycin Counseling:  I discussed with the patient the risks of azithromycin including but not limited to GI upset, allergic reaction, drug rash, diarrhea, and yeast infections.
Tetracycline Counseling: Patient counseled regarding possible photosensitivity and increased risk for sunburn.  Patient instructed to avoid sunlight, if possible.  When exposed to sunlight, patients should wear protective clothing, sunglasses, and sunscreen.  The patient was instructed to call the office immediately if the following severe adverse effects occur:  hearing changes, easy bruising/bleeding, severe headache, or vision changes.  The patient verbalized understanding of the proper use and possible adverse effects of tetracycline.  All of the patient's questions and concerns were addressed. Patient understands to avoid pregnancy while on therapy due to potential birth defects.
Doxycycline Pregnancy And Lactation Text: This medication is Pregnancy Category D and not consider safe during pregnancy. It is also excreted in breast milk but is considered safe for shorter treatment courses.
Sarecycline Counseling: Patient advised regarding possible photosensitivity and discoloration of the teeth, skin, lips, tongue and gums.  Patient instructed to avoid sunlight, if possible.  When exposed to sunlight, patients should wear protective clothing, sunglasses, and sunscreen.  The patient was instructed to call the office immediately if the following severe adverse effects occur:  hearing changes, easy bruising/bleeding, severe headache, or vision changes.  The patient verbalized understanding of the proper use and possible adverse effects of sarecycline.  All of the patient's questions and concerns were addressed.
Birth Control Pills Pregnancy And Lactation Text: This medication should be avoided if pregnant and for the first 30 days post-partum.
Detail Level: Simple

## 2021-07-14 NOTE — HPI: OTHER
Condition:: acne
Please Describe Your Condition:: States she has flaring of her jawline acne present for years.  not currently on any therapy.  history of migranes with auras.

## 2021-11-11 ENCOUNTER — HOSPITAL ENCOUNTER (OUTPATIENT)
Dept: LAB | Facility: MEDICAL CENTER | Age: 41
End: 2021-11-11
Attending: INTERNAL MEDICINE
Payer: COMMERCIAL

## 2021-11-11 ENCOUNTER — OFFICE VISIT (OUTPATIENT)
Dept: MEDICAL GROUP | Facility: PHYSICIAN GROUP | Age: 41
End: 2021-11-11
Payer: COMMERCIAL

## 2021-11-11 VITALS
HEIGHT: 65 IN | DIASTOLIC BLOOD PRESSURE: 82 MMHG | HEART RATE: 90 BPM | SYSTOLIC BLOOD PRESSURE: 120 MMHG | WEIGHT: 184 LBS | RESPIRATION RATE: 16 BRPM | OXYGEN SATURATION: 97 % | TEMPERATURE: 98.6 F | BODY MASS INDEX: 30.66 KG/M2

## 2021-11-11 DIAGNOSIS — E04.1 LEFT THYROID NODULE: Chronic | ICD-10-CM

## 2021-11-11 DIAGNOSIS — E55.9 VITAMIN D DEFICIENCY: ICD-10-CM

## 2021-11-11 DIAGNOSIS — K21.9 GASTROESOPHAGEAL REFLUX DISEASE WITHOUT ESOPHAGITIS: Chronic | ICD-10-CM

## 2021-11-11 DIAGNOSIS — G47.31 CENTRAL SLEEP APNEA: ICD-10-CM

## 2021-11-11 DIAGNOSIS — I10 ESSENTIAL HYPERTENSION: Chronic | ICD-10-CM

## 2021-11-11 DIAGNOSIS — E78.5 DYSLIPIDEMIA: ICD-10-CM

## 2021-11-11 LAB
ALT SERPL-CCNC: 8 U/L (ref 2–50)
CHOLEST SERPL-MCNC: 190 MG/DL (ref 100–199)
HDLC SERPL-MCNC: 47 MG/DL
IRON SERPL-MCNC: 203 UG/DL (ref 40–170)
LDLC SERPL CALC-MCNC: 128 MG/DL
T3 SERPL-MCNC: 112 NG/DL (ref 60–181)
T4 FREE SERPL-MCNC: 1.2 NG/DL (ref 0.93–1.7)
TRIGL SERPL-MCNC: 74 MG/DL (ref 0–149)
TSH SERPL DL<=0.005 MIU/L-ACNC: 0.8 UIU/ML (ref 0.38–5.33)
VIT B12 SERPL-MCNC: 431 PG/ML (ref 211–911)

## 2021-11-11 PROCEDURE — 82607 VITAMIN B-12: CPT

## 2021-11-11 PROCEDURE — 83540 ASSAY OF IRON: CPT

## 2021-11-11 PROCEDURE — 84480 ASSAY TRIIODOTHYRONINE (T3): CPT

## 2021-11-11 PROCEDURE — 82306 VITAMIN D 25 HYDROXY: CPT

## 2021-11-11 PROCEDURE — 80061 LIPID PANEL: CPT

## 2021-11-11 PROCEDURE — 84443 ASSAY THYROID STIM HORMONE: CPT

## 2021-11-11 PROCEDURE — 99214 OFFICE O/P EST MOD 30 MIN: CPT | Performed by: INTERNAL MEDICINE

## 2021-11-11 PROCEDURE — 84460 ALANINE AMINO (ALT) (SGPT): CPT

## 2021-11-11 PROCEDURE — 84439 ASSAY OF FREE THYROXINE: CPT

## 2021-11-11 PROCEDURE — 36415 COLL VENOUS BLD VENIPUNCTURE: CPT

## 2021-11-11 RX ORDER — OMEPRAZOLE 20 MG/1
20 CAPSULE, DELAYED RELEASE ORAL
Qty: 90 CAPSULE | Refills: 3 | Status: SHIPPED | OUTPATIENT
Start: 2021-11-11 | End: 2022-12-24

## 2021-11-11 RX ORDER — HYDROCHLOROTHIAZIDE 25 MG/1
25 TABLET ORAL
Qty: 90 TABLET | Refills: 3 | Status: SHIPPED | OUTPATIENT
Start: 2021-11-11 | End: 2022-12-24

## 2021-11-11 RX ORDER — AMOXICILLIN 875 MG/1
TABLET, COATED ORAL
COMMUNITY
Start: 2021-08-31 | End: 2021-11-11

## 2021-11-11 RX ORDER — CLINDAMYCIN HYDROCHLORIDE 300 MG/1
CAPSULE ORAL
COMMUNITY
Start: 2021-09-20 | End: 2021-11-11

## 2021-11-11 RX ORDER — PREDNISONE 50 MG/1
TABLET ORAL
COMMUNITY
Start: 2021-09-20 | End: 2021-11-11

## 2021-11-11 RX ORDER — MONTELUKAST SODIUM 10 MG/1
10 TABLET ORAL
Qty: 90 TABLET | Refills: 3 | Status: SHIPPED | OUTPATIENT
Start: 2021-11-11 | End: 2022-12-24

## 2021-11-11 RX ORDER — ACYCLOVIR 400 MG/1
TABLET ORAL
COMMUNITY
Start: 2021-09-29 | End: 2021-11-11

## 2021-11-11 RX ORDER — AMOXICILLIN AND CLAVULANATE POTASSIUM 875; 125 MG/1; MG/1
TABLET, FILM COATED ORAL
COMMUNITY
Start: 2021-10-20 | End: 2021-11-11

## 2021-11-11 RX ORDER — FLUTICASONE PROPIONATE 50 MCG
1 SPRAY, SUSPENSION (ML) NASAL DAILY
Qty: 16 G | Refills: 5 | Status: SHIPPED | OUTPATIENT
Start: 2021-11-11 | End: 2023-05-16

## 2021-11-11 ASSESSMENT — FIBROSIS 4 INDEX: FIB4 SCORE: 0.64

## 2021-11-11 ASSESSMENT — PATIENT HEALTH QUESTIONNAIRE - PHQ9: CLINICAL INTERPRETATION OF PHQ2 SCORE: 0

## 2021-11-11 NOTE — ASSESSMENT & PLAN NOTE
Last thyroid ultrasound completed in June 2021 showed    Nodules >= 1cm:  1     Nodule #1  Location:  Left  upper  Size:  0.9 x 0.8 x 0.5 cm  Composition:  Solid-2  Echogenicity:  Hypoechoic-2  Shape:  Wider than tall-0  Margins:  Smooth-0  Echogenic Foci:  None-0     ACR TIRADS points/category:  4 - TR4 - Moderately Suspicious     There are no new findings.     IMPRESSION:     1.  There is a stable appearance to the subcentimeter probably benign left thyroid nodule.     ACR TI-RADS Recommendations  TR4 - follow up ultrasound in 1,2,3 and 5 years    Patient is asymptomatic  I have requested an ultrasound to be done for June 2022.  Lab tests ordered to include TSH T4 and T3.

## 2021-11-11 NOTE — ASSESSMENT & PLAN NOTE
This is a chronic condition.  Patient presently taking omeprazole.  She denies nausea vomiting dysphagia or unexplained weight loss.

## 2021-11-11 NOTE — PROGRESS NOTES
"PRIMARY CARE CLINIC VISIT  Chief Complaint   Patient presents with   • Follow-Up     Prescription refills  Discuss medical conditions    History of Present Illness     Left thyroid nodule  Last thyroid ultrasound completed in June 2021 showed    Nodules >= 1cm:  1     Nodule #1  Location:  Left  upper  Size:  0.9 x 0.8 x 0.5 cm  Composition:  Solid-2  Echogenicity:  Hypoechoic-2  Shape:  Wider than tall-0  Margins:  Smooth-0  Echogenic Foci:  None-0     ACR TIRADS points/category:  4 - TR4 - Moderately Suspicious     There are no new findings.     IMPRESSION:     1.  There is a stable appearance to the subcentimeter probably benign left thyroid nodule.     ACR TI-RADS Recommendations  TR4 - follow up ultrasound in 1,2,3 and 5 years    Patient is asymptomatic  I have requested an ultrasound to be done for June 2022.  Lab tests ordered to include TSH T4 and T3.    Vitamin D deficiency  Chronic condition.  The patient is due for lab test.    Gastroesophageal reflux disease without esophagitis  This is a chronic condition.  Patient presently taking omeprazole.  She denies nausea vomiting dysphagia or unexplained weight loss.    Essential hypertension  Chronic condition.  The patient currently taking hydrochlorothiazide daily.  Patient requests refill.  Blood pressure has been well controlled.    Dyslipidemia  Patient currently on diet therapy.  Lab tests ordered for follow-up.      No current outpatient medications on file prior to visit.     No current facility-administered medications on file prior to visit.        Allergies: Sulfa drugs    ROS  As per HPI above. All other systems reviewed and negative.      Past Medical, Social, and Family history reviewed and updated in EPIC     Objective     /82   Pulse 90   Temp 37 °C (98.6 °F) (Temporal)   Resp 16   Ht 1.651 m (5' 5\")   Wt 83.5 kg (184 lb)   SpO2 97%    Body mass index is 30.62 kg/m².    General: alert and oriented  Cardiovascular: regular rate and " rhythm  Pulmonary: lungs : no wheezing or rales  Gastrointestinal: no tenderness to palpation. No obvious mass noted          Assessment and Plan     1. Left thyroid nodule  Thyroid ultrasound ordered for June 2022.  Lab tests ordered.    - US-THYROID; Future  - FREE THYROXINE; Future  - TRIIDOTHYRONINE; Future  - TSH; Future    2. Dyslipidemia  Chronic condition.  Advised the patient low-fat low-cholesterol diet.  Lab tests ordered.  - Lipid Profile; Future  - ALANINE AMINO-TRANS; Future    3. Central sleep apnea  Patient reported history of central sleep apnea.  Patient has not seen sleep medicine specialist.  Referral submitted.  Patient also reported that she is currently working with an oral surgeon and presently getting dental appliances which seem to help  - Referral to Pulmonary and Sleep Medicine    4. Vitamin D deficiency    - VITAMIN D,25 HYDROXY; Future  - VITAMIN B12; Future  - IRON; Future    5. Gastroesophageal reflux disease without esophagitis  Neck stable condition per continue with omeprazole.    6. Essential hypertension  Stable condition.  Continue with hydrochlorothiazide.  Prescription refilled.            Follow up: Recommend follow-up after lab test done.  Otherwise routine follow-up approximately 6 months      Healthcare maintenance     There are no preventive care reminders to display for this patient.      -If any problems should arise, patient was advised to contact our office for followup or go to ER to be evaluated.    Please note that this dictation was created using voice recognition software. I have made every reasonable attempt to correct obvious errors, but it is possible there are errors of grammar and possibly content that I did not discover before finalizing the note.      Tommy Golden MD  Internal Medicine  [facility]

## 2021-11-13 LAB — 25(OH)D3 SERPL-MCNC: 53 NG/ML (ref 30–80)

## 2022-02-11 ENCOUNTER — HOSPITAL ENCOUNTER (EMERGENCY)
Facility: MEDICAL CENTER | Age: 42
End: 2022-02-11
Attending: EMERGENCY MEDICINE
Payer: COMMERCIAL

## 2022-02-11 VITALS
DIASTOLIC BLOOD PRESSURE: 81 MMHG | HEART RATE: 89 BPM | WEIGHT: 186.29 LBS | SYSTOLIC BLOOD PRESSURE: 111 MMHG | TEMPERATURE: 97.9 F | OXYGEN SATURATION: 96 % | BODY MASS INDEX: 29.94 KG/M2 | HEIGHT: 66 IN | RESPIRATION RATE: 16 BRPM

## 2022-02-11 DIAGNOSIS — R55 NEAR SYNCOPE: ICD-10-CM

## 2022-02-11 DIAGNOSIS — R20.2 PARESTHESIA: ICD-10-CM

## 2022-02-11 DIAGNOSIS — K21.00 GASTROESOPHAGEAL REFLUX DISEASE WITH ESOPHAGITIS WITHOUT HEMORRHAGE: ICD-10-CM

## 2022-02-11 DIAGNOSIS — E87.6 ACUTE HYPOKALEMIA: ICD-10-CM

## 2022-02-11 LAB
ALBUMIN SERPL BCP-MCNC: 4.1 G/DL (ref 3.2–4.9)
ALBUMIN/GLOB SERPL: 1.3 G/DL
ALP SERPL-CCNC: 85 U/L (ref 30–99)
ALT SERPL-CCNC: 11 U/L (ref 2–50)
ANION GAP SERPL CALC-SCNC: 11 MMOL/L (ref 7–16)
AST SERPL-CCNC: 14 U/L (ref 12–45)
BASOPHILS # BLD AUTO: 0.3 % (ref 0–1.8)
BASOPHILS # BLD: 0.03 K/UL (ref 0–0.12)
BILIRUB SERPL-MCNC: 0.4 MG/DL (ref 0.1–1.5)
BUN SERPL-MCNC: 23 MG/DL (ref 8–22)
CALCIUM SERPL-MCNC: 8.9 MG/DL (ref 8.4–10.2)
CHLORIDE SERPL-SCNC: 100 MMOL/L (ref 96–112)
CO2 SERPL-SCNC: 25 MMOL/L (ref 20–33)
CREAT SERPL-MCNC: 0.73 MG/DL (ref 0.5–1.4)
EKG IMPRESSION: NORMAL
EOSINOPHIL # BLD AUTO: 0.15 K/UL (ref 0–0.51)
EOSINOPHIL NFR BLD: 1.3 % (ref 0–6.9)
ERYTHROCYTE [DISTWIDTH] IN BLOOD BY AUTOMATED COUNT: 42.2 FL (ref 35.9–50)
GLOBULIN SER CALC-MCNC: 3.1 G/DL (ref 1.9–3.5)
GLUCOSE SERPL-MCNC: 109 MG/DL (ref 65–99)
HCG SERPL QL: NEGATIVE
HCT VFR BLD AUTO: 45.2 % (ref 37–47)
HGB BLD-MCNC: 14.9 G/DL (ref 12–16)
IMM GRANULOCYTES # BLD AUTO: 0.07 K/UL (ref 0–0.11)
IMM GRANULOCYTES NFR BLD AUTO: 0.6 % (ref 0–0.9)
LIPASE SERPL-CCNC: 23 U/L (ref 7–58)
LYMPHOCYTES # BLD AUTO: 0.98 K/UL (ref 1–4.8)
LYMPHOCYTES NFR BLD: 8.4 % (ref 22–41)
MCH RBC QN AUTO: 28.7 PG (ref 27–33)
MCHC RBC AUTO-ENTMCNC: 33 G/DL (ref 33.6–35)
MCV RBC AUTO: 86.9 FL (ref 81.4–97.8)
MONOCYTES # BLD AUTO: 0.77 K/UL (ref 0–0.85)
MONOCYTES NFR BLD AUTO: 6.6 % (ref 0–13.4)
NEUTROPHILS # BLD AUTO: 9.68 K/UL (ref 2–7.15)
NEUTROPHILS NFR BLD: 82.8 % (ref 44–72)
NRBC # BLD AUTO: 0 K/UL
NRBC BLD-RTO: 0 /100 WBC
PLATELET # BLD AUTO: 287 K/UL (ref 164–446)
PMV BLD AUTO: 9.3 FL (ref 9–12.9)
POTASSIUM SERPL-SCNC: 3.4 MMOL/L (ref 3.6–5.5)
PROT SERPL-MCNC: 7.2 G/DL (ref 6–8.2)
RBC # BLD AUTO: 5.2 M/UL (ref 4.2–5.4)
SODIUM SERPL-SCNC: 136 MMOL/L (ref 135–145)
TROPONIN T SERPL-MCNC: <6 NG/L (ref 6–19)
WBC # BLD AUTO: 11.7 K/UL (ref 4.8–10.8)

## 2022-02-11 PROCEDURE — 93005 ELECTROCARDIOGRAM TRACING: CPT | Performed by: EMERGENCY MEDICINE

## 2022-02-11 PROCEDURE — 700102 HCHG RX REV CODE 250 W/ 637 OVERRIDE(OP)

## 2022-02-11 PROCEDURE — 84484 ASSAY OF TROPONIN QUANT: CPT

## 2022-02-11 PROCEDURE — 700105 HCHG RX REV CODE 258: Performed by: EMERGENCY MEDICINE

## 2022-02-11 PROCEDURE — A9270 NON-COVERED ITEM OR SERVICE: HCPCS

## 2022-02-11 PROCEDURE — 84703 CHORIONIC GONADOTROPIN ASSAY: CPT

## 2022-02-11 PROCEDURE — 96375 TX/PRO/DX INJ NEW DRUG ADDON: CPT

## 2022-02-11 PROCEDURE — A9270 NON-COVERED ITEM OR SERVICE: HCPCS | Performed by: EMERGENCY MEDICINE

## 2022-02-11 PROCEDURE — 96374 THER/PROPH/DIAG INJ IV PUSH: CPT

## 2022-02-11 PROCEDURE — 99284 EMERGENCY DEPT VISIT MOD MDM: CPT

## 2022-02-11 PROCEDURE — 94760 N-INVAS EAR/PLS OXIMETRY 1: CPT

## 2022-02-11 PROCEDURE — 700111 HCHG RX REV CODE 636 W/ 250 OVERRIDE (IP): Performed by: EMERGENCY MEDICINE

## 2022-02-11 PROCEDURE — 83690 ASSAY OF LIPASE: CPT

## 2022-02-11 PROCEDURE — 36415 COLL VENOUS BLD VENIPUNCTURE: CPT

## 2022-02-11 PROCEDURE — 85025 COMPLETE CBC W/AUTO DIFF WBC: CPT

## 2022-02-11 PROCEDURE — 80053 COMPREHEN METABOLIC PANEL: CPT

## 2022-02-11 PROCEDURE — 700102 HCHG RX REV CODE 250 W/ 637 OVERRIDE(OP): Performed by: EMERGENCY MEDICINE

## 2022-02-11 RX ORDER — ONDANSETRON 2 MG/ML
4 INJECTION INTRAMUSCULAR; INTRAVENOUS ONCE
Status: COMPLETED | OUTPATIENT
Start: 2022-02-11 | End: 2022-02-11

## 2022-02-11 RX ORDER — SUCRALFATE ORAL 1 G/10ML
1 SUSPENSION ORAL 4 TIMES DAILY
Qty: 560 ML | Refills: 0 | Status: SHIPPED | OUTPATIENT
Start: 2022-02-11 | End: 2022-02-25

## 2022-02-11 RX ORDER — ACETAMINOPHEN 500 MG
TABLET ORAL
Status: COMPLETED
Start: 2022-02-11 | End: 2022-02-11

## 2022-02-11 RX ORDER — POTASSIUM CHLORIDE 20 MEQ/1
20 TABLET, EXTENDED RELEASE ORAL ONCE
Status: COMPLETED | OUTPATIENT
Start: 2022-02-11 | End: 2022-02-11

## 2022-02-11 RX ORDER — SODIUM CHLORIDE 9 MG/ML
1000 INJECTION, SOLUTION INTRAVENOUS ONCE
Status: COMPLETED | OUTPATIENT
Start: 2022-02-11 | End: 2022-02-11

## 2022-02-11 RX ADMIN — FAMOTIDINE 20 MG: 10 INJECTION, SOLUTION INTRAVENOUS at 05:15

## 2022-02-11 RX ADMIN — SODIUM CHLORIDE 1000 ML: 9 INJECTION, SOLUTION INTRAVENOUS at 05:15

## 2022-02-11 RX ADMIN — ONDANSETRON 4 MG: 2 INJECTION INTRAMUSCULAR; INTRAVENOUS at 05:15

## 2022-02-11 RX ADMIN — ACETAMINOPHEN 1000 MG: 500 TABLET, FILM COATED ORAL at 06:30

## 2022-02-11 RX ADMIN — POTASSIUM CHLORIDE 20 MEQ: 20 TABLET, EXTENDED RELEASE ORAL at 06:00

## 2022-02-11 NOTE — ED TRIAGE NOTES
"Chief Complaint   Patient presents with   • Heartburn     about 2 hours PTA   • Nausea     after the heart burn started, no vomiting   • Dizziness     \"while walking into the kitchen everything went black, I was smashing into the walls and managed to walk back and sit on the toilet before I fell but I was very dizzy and both of my arms and legs went numb\" states all symptoms lasted for 15 minutes and have mostly resolved but \"my hands still feel very funky\".      /77   Pulse 94   Temp 36.6 °C (97.8 °F) (Temporal)   Resp 18   Ht 1.676 m (5' 6\")   Wt 84.5 kg (186 lb 4.6 oz)   SpO2 96%   BMI 30.07 kg/m²     "

## 2022-02-11 NOTE — ED PROVIDER NOTES
"ED Provider Note    ED Provider Note    Scribed for Shona Man MD by Shona Man M.D.. 2/11/2022, 4:51 AM.    Primary care provider: Tommy Golden M.D.  Means of arrival: Private  History obtained from: Patient and family  History limited by: None    CHIEF COMPLAINT  Chief Complaint   Patient presents with   • Heartburn     about 2 hours PTA   • Nausea     after the heart burn started, no vomiting   • Dizziness     \"while walking into the kitchen everything went black, I was smashing into the walls and managed to walk back and sit on the toilet before I fell but I was very dizzy and both of my arms and legs went numb\" states all symptoms lasted for 15 minutes and have mostly resolved but \"my hands still feel very funky\".        HPI  Татьяна Velez is a 41 y.o. female who presents to the Emergency Department for evaluation of transient lightheaded sensation.  Patient notes she awoke from sleep noting heartburn.  She does have history of GERD, she notes she takes omeprazole daily, but it has been less effective and she has been having more symptoms for the last week.  She notes nausea when she woke up as well, she relates however after she stood up she started to feel very lightheaded, she relates she walked into her kitchen and \"everything went black,\" she describes disequilibrium but did not lose postural tone or fall or sustain any significant trauma.  Patient notes she also felt tingling sensation in her hands and feet, she describes what sounds good carpal spasm in both of her hands as well, this lasted as she estimates about 15 minutes.  She notes she is feeling better but describes chills at this time.  She noted \"flushing\" initially but no fever.  No vomiting, she does note the \"heartburn\" sensation is localized to the center of the chest and does describe an acid brash with belching more than usual.  No dyspnea, no trauma, no anticoagulation.    REVIEW OF SYSTEMS  Pertinent " positives include lightheaded, describes carpal spasm and tingling in all 4 extremities symmetrically, nausea, heartburn. Pertinent negatives include no isolated weakness to single side of the body or face, no complete loss of consciousness, no trauma.  All other systems reviewed and negative.    PAST MEDICAL HISTORY   has a past medical history of Bronchitis, Chickenpox, GERD (gastroesophageal reflux disease), Goiter (4/30/2019), Hypertension, Melanoma ([facility]), Migraine, Mild depression ([facility]) (3/19/2019), Nasal drainage, Tonsillitis, and Venereal disease.    SURGICAL HISTORY   has a past surgical history that includes skin resurfacing.    SOCIAL HISTORY  Social History     Tobacco Use   • Smoking status: Never Smoker   • Smokeless tobacco: Never Used   Vaping Use   • Vaping Use: Never used   Substance Use Topics   • Alcohol use: No   • Drug use: No      Social History     Substance and Sexual Activity   Drug Use No       FAMILY HISTORY  Family History   Problem Relation Age of Onset   • Heart Disease Maternal Grandmother    • Heart Disease Paternal Grandfather         heart attack   • Hypertension Mother    • Diabetes Mother    • Obesity Mother    • Hypertension Father    • Obesity Father    • Sleep Apnea Father    • Alcohol/Drug Maternal Grandfather    • Lung Disease Paternal Grandmother    • Heart Disease Paternal Grandmother         many heart attacks       CURRENT MEDICATIONS  Home Medications     Reviewed by Franklin Dee R.N. (Registered Nurse) on 02/11/22 at 0428  Med List Status: Not Addressed   Medication Last Dose Status   fluticasone (FLONASE) 50 MCG/ACT nasal spray  Active   hydroCHLOROthiazide (HYDRODIURIL) 25 MG Tab  Active   montelukast (SINGULAIR) 10 MG Tab  Active   omeprazole (PRILOSEC) 20 MG delayed-release capsule  Active                ALLERGIES  Allergies   Allergen Reactions   • Sulfa Drugs Unspecified       PHYSICAL EXAM  VITAL SIGNS: /77   Pulse 94   Temp 36.6 °C (97.8 °F) (Temporal)   " Resp 18   Ht 1.676 m (5' 6\")   Wt 84.5 kg (186 lb 4.6 oz)   SpO2 96%   BMI 30.07 kg/m²     General: Alert, no acute distress  Skin: Warm, dry, mildly pale, otherwise normal for ethnicity  Head: Normocephalic, atraumatic  Neck: Trachea midline, no tenderness  Eye: PERRL, normal conjunctiva, extraocular movements intact without nystagmus.  ENMT: Oral mucosa pink and mildly dry  Cardiovascular: Regular rate and rhythm, No murmur, Normal peripheral perfusion  Respiratory: Lungs CTA, respirations are non-labored, breath sounds are equal  Gastrointestinal: non distended  Musculoskeletal: No swelling, no deformity  Neurological: Alert and oriented to person, place, time, and situation.  Cranial nerves II through XII grossly intact.  Upper and lower extremity strength and sensation 5 x 5 and symmetrical bilaterally.  No upper nor lower extremity pronator drift.  Downgoing Babinski signs, 2+ symmetrical DTRs.  Lymphatics: No lymphadenopathy  Psychiatric: Cooperative, appropriate mood & affect      DIAGNOSTIC STUDIES/PROCEDURES    LABS  Results for orders placed or performed during the hospital encounter of 02/11/22   CBC WITH DIFFERENTIAL   Result Value Ref Range    WBC 11.7 (H) 4.8 - 10.8 K/uL    RBC 5.20 4.20 - 5.40 M/uL    Hemoglobin 14.9 12.0 - 16.0 g/dL    Hematocrit 45.2 37.0 - 47.0 %    MCV 86.9 81.4 - 97.8 fL    MCH 28.7 27.0 - 33.0 pg    MCHC 33.0 (L) 33.6 - 35.0 g/dL    RDW 42.2 35.9 - 50.0 fL    Platelet Count 287 164 - 446 K/uL    MPV 9.3 9.0 - 12.9 fL    Neutrophils-Polys 82.80 (H) 44.00 - 72.00 %    Lymphocytes 8.40 (L) 22.00 - 41.00 %    Monocytes 6.60 0.00 - 13.40 %    Eosinophils 1.30 0.00 - 6.90 %    Basophils 0.30 0.00 - 1.80 %    Immature Granulocytes 0.60 0.00 - 0.90 %    Nucleated RBC 0.00 /100 WBC    Neutrophils (Absolute) 9.68 (H) 2.00 - 7.15 K/uL    Lymphs (Absolute) 0.98 (L) 1.00 - 4.80 K/uL    Monos (Absolute) 0.77 0.00 - 0.85 K/uL    Eos (Absolute) 0.15 0.00 - 0.51 K/uL    Baso (Absolute) " 0.03 0.00 - 0.12 K/uL    Immature Granulocytes (abs) 0.07 0.00 - 0.11 K/uL    NRBC (Absolute) 0.00 K/uL   COMP METABOLIC PANEL   Result Value Ref Range    Sodium 136 135 - 145 mmol/L    Potassium 3.4 (L) 3.6 - 5.5 mmol/L    Chloride 100 96 - 112 mmol/L    Co2 25 20 - 33 mmol/L    Anion Gap 11.0 7.0 - 16.0    Glucose 109 (H) 65 - 99 mg/dL    Bun 23 (H) 8 - 22 mg/dL    Creatinine 0.73 0.50 - 1.40 mg/dL    Calcium 8.9 8.4 - 10.2 mg/dL    AST(SGOT) 14 12 - 45 U/L    ALT(SGPT) 11 2 - 50 U/L    Alkaline Phosphatase 85 30 - 99 U/L    Total Bilirubin 0.4 0.1 - 1.5 mg/dL    Albumin 4.1 3.2 - 4.9 g/dL    Total Protein 7.2 6.0 - 8.2 g/dL    Globulin 3.1 1.9 - 3.5 g/dL    A-G Ratio 1.3 g/dL   TROPONIN   Result Value Ref Range    Troponin T <6 6 - 19 ng/L   LIPASE   Result Value Ref Range    Lipase 23 7 - 58 U/L   HCG QUAL SERUM   Result Value Ref Range    Beta-Hcg Qualitative Serum Negative Negative   ESTIMATED GFR   Result Value Ref Range    GFR If African American >60 >60 mL/min/1.73 m 2    GFR If Non African American >60 >60 mL/min/1.73 m 2   EKG (NOW)   Result Value Ref Range    Report       [facility] Emergency Dept.    Test Date:  2022  Pt Name:    NARCISO RYAN               Department: [facility]  MRN:        [id number]                      [facility]   Gender:     Female                       Technician: [id number]  :        1980                   Requested By:NAS BAEZ  Order #:    [id number]                    Reading MD: NAS BAEZ MD    Measurements  Intervals                                Axis  Rate:       78                           P:          67  SC:         155                          QRS:        82  QRSD:       100                          T:          62  QT:         391  QTc:        446    Interpretive Statements  Sinus rhythm  Prominent P waves, nondiagnostic  Abnormal R-wave progression, early transition  No previous ECG available for  "comparison  Electronically Signed On 2- 5:23:50 PST by NAS BAEZ MD       All labs reviewed by me.    EKG  12 Lead EKG obtained at 0457 and interpreted by me to show:  Rhythm: Normal sinus rhythm   Rate: 78  Axis: Normal  Intervals: Normal  Q Waves: Normal  No diagnostic ST segment elevation  No long QT, no delta wave, no Brugada  Clinical Impression: Normal EKG  Compared to none available        COURSE & MEDICAL DECISION MAKING  Pertinent Labs & Imaging studies reviewed. (See chart for details)    4:51 AM - Patient seen and examined at bedside. Patient will be treated with 1 L of crystalloid, ondansetron 4 mg IV, famotidine 20 mg IV. Ordered cardiac work-up to evaluate her symptoms. The differential diagnoses include but are not limited to: Near syncope, electrolyte abnormality, hyperventilation syndrome    0543: Patient feeling much better, reflux improving.  Relieved to hear of unremarkable studies thus far.    HYDRATION: Based on the patient's presentation of Dehydration the patient was given IV fluids. IV Hydration was used because oral hydration was not adequate alone. Upon recheck following hydration, the patient was Doing better, skin tone improved with IV fluids.    Patient Vitals for the past 24 hrs:   BP Temp Temp src Pulse Resp SpO2 Height Weight   02/11/22 0401 106/77 36.6 °C (97.8 °F) Temporal 94 18 96 % 1.676 m (5' 6\") 84.5 kg (186 lb 4.6 oz)         Decision Making:  This is a 41 y.o. year old female who presents with symptoms consistent with near syncope.  She also describes a carpal spasm and paresthesia in all 4 extremities but no single-sided deficit.  On exam she has a thankfully unremarkable neurologic exam with an NIH stroke scale of 0.  EKG is nonischemic, troponin unremarkable, she does have discomfort to the precordium described as \"heartburn\" but otherwise cardiac work-up is reassuring.  Heart score is 2, low risk ratification, doubt ACS.  She has no tachycardia, no " tachypnea, no hypoxia, no history of hemoptysis and no unilateral limb swelling, this would not be consistent with clinically significant pulmonary embolism according to PERC criteria.  Presentation consistent with near syncope, treated with IV fluids given low normal blood pressure and near syncopal presentation as described above.    The patient will return for new or worsening symptoms and is stable at the time of discharge.      DISPOSITION:  Patient will be discharged home in stable condition.    FOLLOW UP:  Tommy Golden M.D.  [address] [place] [id number] [phone]    Schedule an appointment as soon as possible for a visit in 2 days        OUTPATIENT MEDICATIONS:  New Prescriptions    SUCRALFATE (CARAFATE) 1 GM/10ML SUSPENSION    Take 10 mL by mouth 4 times a day for 14 days.         FINAL IMPRESSION  1. Near syncope    2. Paresthesia    3. Gastroesophageal reflux disease with esophagitis without hemorrhage    4. Acute hypokalemia          Shona WARE M.D. (Hayde), am scribing for, and in the presence of, Shona Man MD.    Electronically signed by: Shona Man M.D. (Claudetteibbina), 2/11/2022    Shona WARE MD personally performed the services described in this documentation, as scribed by Shona Man M.D. in my presence, and it is both accurate and complete    The note accurately reflects work and decisions made by me.  Shona Man M.D.  2/11/2022  5:26 AM

## 2022-02-11 NOTE — ED NOTES
PT cleared for DC home advised to f/u with pcp education for new RX provided pt verbalized understanding & has no further questions vss upon DCC pt in stable condition advised to return to ED for any worsening symptoms

## 2022-06-01 NOTE — ASSESSMENT & PLAN NOTE
Chronic condition.  The patient currently taking hydrochlorothiazide daily.  Patient requests refill.  Blood pressure has been well controlled.   Humira Counseling:  I discussed with the patient the risks of adalimumab including but not limited to myelosuppression, immunosuppression, autoimmune hepatitis, demyelinating diseases, lymphoma, and serious infections.  The patient understands that monitoring is required including a PPD at baseline and must alert us or the primary physician if symptoms of infection or other concerning signs are noted.

## 2022-06-09 ENCOUNTER — HOSPITAL ENCOUNTER (OUTPATIENT)
Dept: RADIOLOGY | Facility: MEDICAL CENTER | Age: 42
End: 2022-06-09
Attending: INTERNAL MEDICINE
Payer: COMMERCIAL

## 2022-06-09 ENCOUNTER — OFFICE VISIT (OUTPATIENT)
Dept: MEDICAL GROUP | Facility: PHYSICIAN GROUP | Age: 42
End: 2022-06-09
Payer: COMMERCIAL

## 2022-06-09 VITALS
HEART RATE: 71 BPM | HEIGHT: 66 IN | TEMPERATURE: 99.2 F | OXYGEN SATURATION: 98 % | DIASTOLIC BLOOD PRESSURE: 76 MMHG | RESPIRATION RATE: 16 BRPM | SYSTOLIC BLOOD PRESSURE: 118 MMHG | BODY MASS INDEX: 31.34 KG/M2 | WEIGHT: 195 LBS

## 2022-06-09 DIAGNOSIS — E04.1 LEFT THYROID NODULE: ICD-10-CM

## 2022-06-09 DIAGNOSIS — E78.5 DYSLIPIDEMIA: Chronic | ICD-10-CM

## 2022-06-09 DIAGNOSIS — K21.9 GASTROESOPHAGEAL REFLUX DISEASE WITHOUT ESOPHAGITIS: Chronic | ICD-10-CM

## 2022-06-09 DIAGNOSIS — I10 ESSENTIAL HYPERTENSION: Chronic | ICD-10-CM

## 2022-06-09 DIAGNOSIS — Z12.31 ENCOUNTER FOR SCREENING MAMMOGRAM FOR MALIGNANT NEOPLASM OF BREAST: ICD-10-CM

## 2022-06-09 DIAGNOSIS — Z00.00 WELL ADULT EXAM: ICD-10-CM

## 2022-06-09 PROCEDURE — 99214 OFFICE O/P EST MOD 30 MIN: CPT | Performed by: INTERNAL MEDICINE

## 2022-06-09 PROCEDURE — 76536 US EXAM OF HEAD AND NECK: CPT

## 2022-06-09 RX ORDER — CYCLOBENZAPRINE HCL 10 MG
TABLET ORAL
COMMUNITY
Start: 2022-03-28 | End: 2022-06-09

## 2022-06-09 RX ORDER — AMOXICILLIN AND CLAVULANATE POTASSIUM 875; 125 MG/1; MG/1
TABLET, FILM COATED ORAL
COMMUNITY
Start: 2022-05-18 | End: 2022-06-09

## 2022-06-09 ASSESSMENT — FIBROSIS 4 INDEX: FIB4 SCORE: 0.6

## 2022-06-09 ASSESSMENT — PATIENT HEALTH QUESTIONNAIRE - PHQ9: CLINICAL INTERPRETATION OF PHQ2 SCORE: 0

## 2022-06-09 NOTE — ASSESSMENT & PLAN NOTE
Currently she is taking hydrochlorothiazide.  Previous lab test showed patient with mildly low potassium level.  I am requesting blood test to be done for follow-up.  We also discussed with the patient that we could also use a different blood pressure med.    The patient however would like to get the blood work done first then she will decide

## 2022-06-09 NOTE — ASSESSMENT & PLAN NOTE
Patient was noted with left thyroid nodule on previous ultrasound.   Patient is presently asymptomatic.  I am requesting the ultrasound to be done for follow-up as well as lab test.

## 2022-06-09 NOTE — ASSESSMENT & PLAN NOTE
Stable condition.  The patient takes omeprazole daily.  She denies nausea vomiting dysphagia or unexplained weight loss.

## 2022-06-09 NOTE — PROGRESS NOTES
"PRIMARY CARE CLINIC VISIT  Chief Complaint   Patient presents with   • Follow-Up     Follow-up hyperlipidemia and thyroid condition    History of Present Illness     Left thyroid nodule  Patient was noted with left thyroid nodule on previous ultrasound.   Patient is presently asymptomatic.  I am requesting the ultrasound to be done for follow-up as well as lab test.    Dyslipidemia  Patient presently on diet therapy.  Blood tests ordered for follow-up.    Essential hypertension  Currently she is taking hydrochlorothiazide.  Previous lab test showed patient with mildly low potassium level.  I am requesting blood test to be done for follow-up.  We also discussed with the patient that we could also use a different blood pressure med.    The patient however would like to get the blood work done first then she will decide    Gastroesophageal reflux disease without esophagitis  Stable condition.  The patient takes omeprazole daily.  She denies nausea vomiting dysphagia or unexplained weight loss.      Current Outpatient Medications on File Prior to Visit   Medication Sig Dispense Refill   • fluticasone (FLONASE) 50 MCG/ACT nasal spray Administer 1 Spray into affected nostril(S) every day. 16 g 5   • hydroCHLOROthiazide (HYDRODIURIL) 25 MG Tab Take 1 Tablet by mouth every day. 90 Tablet 3   • montelukast (SINGULAIR) 10 MG Tab Take 1 Tablet by mouth every day. 90 Tablet 3   • omeprazole (PRILOSEC) 20 MG delayed-release capsule Take 1 Capsule by mouth every day. 90 Capsule 3     No current facility-administered medications on file prior to visit.        Allergies: Sulfa drugs    ROS  As per HPI above. All other systems reviewed and negative.      Past Medical, Social, and Family history reviewed and updated in EPIC     Objective     /76 (BP Location: Left arm, Patient Position: Sitting, BP Cuff Size: Adult)   Pulse 71   Temp 37.3 °C (99.2 °F) (Temporal)   Resp 16   Ht 1.676 m (5' 6\")   Wt 88.5 kg (195 lb)   SpO2 " 98%    Body mass index is 31.47 kg/m².    General: alert and oriented  Cardiovascular: regular rate and rhythm  Pulmonary: lungs : no wheezing   Gastrointestinal: BS present. No obvious mass noted          Assessment and Plan     1. Left thyroid nodule  - US-THYROID; Future  Lab test requested.  Recommend follow-up after ultrasound and lab test done.  2. Dyslipidemia  Recommended diet and exercise.  Lipid panel requested.  3. Essential hypertension  Chronic condition.  Chemistry panel requested.  Patient would like to continue to take hydrochlorothiazide  4. Gastroesophageal reflux disease without esophagitis  Stable condition.  Continue with omeprazole.  5. Encounter for screening mammogram for malignant neoplasm of breast  - MA-SCREENING MAMMO BILAT W/TOMOSYNTHESIS W/CAD; Future    6. Well adult exam  - Basic Metabolic Panel; Future  - Lipid Profile; Future  - TSH; Future  - CBC WITHOUT DIFFERENTIAL; Future  - VITAMIN D,25 HYDROXY; Future  - VITAMIN B12; Future                         Please note that this dictation was created using voice recognition software. I have made every reasonable attempt to correct obvious errors but there may be errors of grammar and content that I may have overlooked prior to finalization of this note.      Tommy Golden MD  Internal Medicine  [facility]

## 2022-07-01 ENCOUNTER — HOSPITAL ENCOUNTER (OUTPATIENT)
Dept: LAB | Facility: MEDICAL CENTER | Age: 42
End: 2022-07-01
Attending: INTERNAL MEDICINE
Payer: COMMERCIAL

## 2022-07-01 DIAGNOSIS — Z00.00 WELL ADULT EXAM: ICD-10-CM

## 2022-07-01 LAB
25(OH)D3 SERPL-MCNC: 56 NG/ML (ref 30–100)
ANION GAP SERPL CALC-SCNC: 10 MMOL/L (ref 7–16)
BUN SERPL-MCNC: 18 MG/DL (ref 8–22)
CALCIUM SERPL-MCNC: 8.9 MG/DL (ref 8.5–10.5)
CHLORIDE SERPL-SCNC: 103 MMOL/L (ref 96–112)
CHOLEST SERPL-MCNC: 174 MG/DL (ref 100–199)
CO2 SERPL-SCNC: 23 MMOL/L (ref 20–33)
CREAT SERPL-MCNC: 0.72 MG/DL (ref 0.5–1.4)
ERYTHROCYTE [DISTWIDTH] IN BLOOD BY AUTOMATED COUNT: 46.3 FL (ref 35.9–50)
FASTING STATUS PATIENT QL REPORTED: NORMAL
GFR SERPLBLD CREATININE-BSD FMLA CKD-EPI: 108 ML/MIN/1.73 M 2
GLUCOSE SERPL-MCNC: 96 MG/DL (ref 65–99)
HCT VFR BLD AUTO: 45.3 % (ref 37–47)
HDLC SERPL-MCNC: 39 MG/DL
HGB BLD-MCNC: 14.4 G/DL (ref 12–16)
LDLC SERPL CALC-MCNC: 117 MG/DL
MCH RBC QN AUTO: 27.7 PG (ref 27–33)
MCHC RBC AUTO-ENTMCNC: 31.8 G/DL (ref 33.6–35)
MCV RBC AUTO: 87.3 FL (ref 81.4–97.8)
PLATELET # BLD AUTO: 319 K/UL (ref 164–446)
PMV BLD AUTO: 10.3 FL (ref 9–12.9)
POTASSIUM SERPL-SCNC: 3.8 MMOL/L (ref 3.6–5.5)
RBC # BLD AUTO: 5.19 M/UL (ref 4.2–5.4)
SODIUM SERPL-SCNC: 136 MMOL/L (ref 135–145)
TRIGL SERPL-MCNC: 90 MG/DL (ref 0–149)
TSH SERPL DL<=0.005 MIU/L-ACNC: 1.13 UIU/ML (ref 0.38–5.33)
VIT B12 SERPL-MCNC: 357 PG/ML (ref 211–911)
WBC # BLD AUTO: 6.9 K/UL (ref 4.8–10.8)

## 2022-07-01 PROCEDURE — 82306 VITAMIN D 25 HYDROXY: CPT

## 2022-07-01 PROCEDURE — 85027 COMPLETE CBC AUTOMATED: CPT

## 2022-07-01 PROCEDURE — 84443 ASSAY THYROID STIM HORMONE: CPT

## 2022-07-01 PROCEDURE — 80048 BASIC METABOLIC PNL TOTAL CA: CPT

## 2022-07-01 PROCEDURE — 80061 LIPID PANEL: CPT

## 2022-07-01 PROCEDURE — 82607 VITAMIN B-12: CPT

## 2022-07-01 PROCEDURE — 36415 COLL VENOUS BLD VENIPUNCTURE: CPT

## 2022-07-13 ENCOUNTER — HOSPITAL ENCOUNTER (OUTPATIENT)
Dept: RADIOLOGY | Facility: MEDICAL CENTER | Age: 42
End: 2022-07-13
Attending: INTERNAL MEDICINE
Payer: COMMERCIAL

## 2022-07-13 DIAGNOSIS — Z12.31 ENCOUNTER FOR SCREENING MAMMOGRAM FOR MALIGNANT NEOPLASM OF BREAST: ICD-10-CM

## 2022-07-13 PROCEDURE — 77063 BREAST TOMOSYNTHESIS BI: CPT

## 2022-12-24 RX ORDER — HYDROCHLOROTHIAZIDE 25 MG/1
25 TABLET ORAL
Qty: 90 TABLET | Refills: 3 | Status: SHIPPED | OUTPATIENT
Start: 2022-12-24 | End: 2023-12-19

## 2022-12-24 RX ORDER — OMEPRAZOLE 20 MG/1
20 CAPSULE, DELAYED RELEASE ORAL
Qty: 90 CAPSULE | Refills: 3 | Status: SHIPPED | OUTPATIENT
Start: 2022-12-24 | End: 2023-12-19

## 2022-12-24 RX ORDER — MONTELUKAST SODIUM 10 MG/1
10 TABLET ORAL
Qty: 90 TABLET | Refills: 3 | Status: SHIPPED | OUTPATIENT
Start: 2022-12-24 | End: 2023-12-19

## 2023-05-16 RX ORDER — FLUTICASONE PROPIONATE 50 MCG
SPRAY, SUSPENSION (ML) NASAL
Qty: 16 ML | Refills: 0 | Status: SHIPPED | OUTPATIENT
Start: 2023-05-16 | End: 2023-06-13

## 2023-06-13 RX ORDER — FLUTICASONE PROPIONATE 50 MCG
SPRAY, SUSPENSION (ML) NASAL
Qty: 16 ML | Refills: 0 | Status: SHIPPED | OUTPATIENT
Start: 2023-06-13 | End: 2023-11-20

## 2023-06-13 NOTE — TELEPHONE ENCOUNTER
Received request via: Pharmacy    Was the patient seen in the last year in this department? No 06/09/2022    Does the patient have an active prescription (recently filled or refills available) for medication(s) requested? No    Does the patient have FPC Plus and need 100 day supply (blood pressure, diabetes and cholesterol meds only)? Patient does not have SCP

## 2023-08-21 NOTE — ASSESSMENT & PLAN NOTE
Patient presently on diet therapy.  Blood tests ordered for follow-up.   Quality 402: Tobacco Use And Help With Quitting Among Adolescents: Patient screened for tobacco and never smoked Quality 431: Preventive Care And Screening: Unhealthy Alcohol Use - Screening: Patient not identified as an unhealthy alcohol user when screened for unhealthy alcohol use using a systematic screening method Quality 226: Preventive Care And Screening: Tobacco Use: Screening And Cessation Intervention: Patient screened for tobacco use and is an ex/non-smoker Detail Level: Detailed

## 2023-09-25 ENCOUNTER — APPOINTMENT (OUTPATIENT)
Dept: MEDICAL GROUP | Facility: PHYSICIAN GROUP | Age: 43
End: 2023-09-25
Payer: COMMERCIAL

## 2023-10-04 ENCOUNTER — HOSPITAL ENCOUNTER (OUTPATIENT)
Dept: LAB | Facility: MEDICAL CENTER | Age: 43
End: 2023-10-04
Attending: INTERNAL MEDICINE
Payer: COMMERCIAL

## 2023-10-04 ENCOUNTER — OFFICE VISIT (OUTPATIENT)
Dept: MEDICAL GROUP | Facility: PHYSICIAN GROUP | Age: 43
End: 2023-10-04
Payer: COMMERCIAL

## 2023-10-04 VITALS
TEMPERATURE: 98.8 F | BODY MASS INDEX: 36.96 KG/M2 | WEIGHT: 230 LBS | OXYGEN SATURATION: 97 % | SYSTOLIC BLOOD PRESSURE: 138 MMHG | DIASTOLIC BLOOD PRESSURE: 86 MMHG | HEART RATE: 66 BPM | HEIGHT: 66 IN | RESPIRATION RATE: 12 BRPM

## 2023-10-04 DIAGNOSIS — R63.5 WEIGHT GAIN: ICD-10-CM

## 2023-10-04 DIAGNOSIS — E04.1 LEFT THYROID NODULE: Chronic | ICD-10-CM

## 2023-10-04 DIAGNOSIS — Z11.59 NEED FOR HEPATITIS C SCREENING TEST: ICD-10-CM

## 2023-10-04 DIAGNOSIS — K21.9 GASTROESOPHAGEAL REFLUX DISEASE WITHOUT ESOPHAGITIS: Chronic | ICD-10-CM

## 2023-10-04 DIAGNOSIS — F39 MOOD DISORDER (HCC): ICD-10-CM

## 2023-10-04 DIAGNOSIS — J30.1 SEASONAL ALLERGIC RHINITIS DUE TO POLLEN: ICD-10-CM

## 2023-10-04 DIAGNOSIS — Z23 NEED FOR VACCINATION: ICD-10-CM

## 2023-10-04 DIAGNOSIS — Z00.00 WELL ADULT EXAM: ICD-10-CM

## 2023-10-04 DIAGNOSIS — E78.5 DYSLIPIDEMIA: Chronic | ICD-10-CM

## 2023-10-04 DIAGNOSIS — E55.9 VITAMIN D DEFICIENCY: Chronic | ICD-10-CM

## 2023-10-04 DIAGNOSIS — Z12.31 ENCOUNTER FOR SCREENING MAMMOGRAM FOR MALIGNANT NEOPLASM OF BREAST: ICD-10-CM

## 2023-10-04 DIAGNOSIS — I10 ESSENTIAL HYPERTENSION: Chronic | ICD-10-CM

## 2023-10-04 PROBLEM — R73.03 PREDIABETES: Chronic | Status: ACTIVE | Noted: 2023-10-04

## 2023-10-04 PROBLEM — R73.03 PREDIABETES: Status: ACTIVE | Noted: 2023-10-04

## 2023-10-04 LAB
25(OH)D3 SERPL-MCNC: 43 NG/ML (ref 30–100)
ANION GAP SERPL CALC-SCNC: 11 MMOL/L (ref 7–16)
BUN SERPL-MCNC: 13 MG/DL (ref 8–22)
CALCIUM SERPL-MCNC: 8.7 MG/DL (ref 8.5–10.5)
CHLORIDE SERPL-SCNC: 102 MMOL/L (ref 96–112)
CHOLEST SERPL-MCNC: 172 MG/DL (ref 100–199)
CO2 SERPL-SCNC: 26 MMOL/L (ref 20–33)
CREAT SERPL-MCNC: 0.7 MG/DL (ref 0.5–1.4)
ERYTHROCYTE [DISTWIDTH] IN BLOOD BY AUTOMATED COUNT: 44.5 FL (ref 35.9–50)
EST. AVERAGE GLUCOSE BLD GHB EST-MCNC: 117 MG/DL
GFR SERPLBLD CREATININE-BSD FMLA CKD-EPI: 110 ML/MIN/1.73 M 2
GLUCOSE SERPL-MCNC: 75 MG/DL (ref 65–99)
HBA1C MFR BLD: 5.7 % (ref 4–5.6)
HCT VFR BLD AUTO: 47.3 % (ref 37–47)
HCV AB SER QL: NORMAL
HDLC SERPL-MCNC: 34 MG/DL
HGB BLD-MCNC: 15.2 G/DL (ref 12–16)
LDLC SERPL CALC-MCNC: 117 MG/DL
MCH RBC QN AUTO: 27.2 PG (ref 27–33)
MCHC RBC AUTO-ENTMCNC: 32.1 G/DL (ref 32.2–35.5)
MCV RBC AUTO: 84.6 FL (ref 81.4–97.8)
PLATELET # BLD AUTO: 336 K/UL (ref 164–446)
PMV BLD AUTO: 10 FL (ref 9–12.9)
POTASSIUM SERPL-SCNC: 4.1 MMOL/L (ref 3.6–5.5)
RBC # BLD AUTO: 5.59 M/UL (ref 4.2–5.4)
SODIUM SERPL-SCNC: 139 MMOL/L (ref 135–145)
TRIGL SERPL-MCNC: 107 MG/DL (ref 0–149)
TSH SERPL DL<=0.005 MIU/L-ACNC: 0.53 UIU/ML (ref 0.38–5.33)
VIT B12 SERPL-MCNC: 564 PG/ML (ref 211–911)
WBC # BLD AUTO: 6 K/UL (ref 4.8–10.8)

## 2023-10-04 PROCEDURE — 85027 COMPLETE CBC AUTOMATED: CPT

## 2023-10-04 PROCEDURE — 3075F SYST BP GE 130 - 139MM HG: CPT | Performed by: INTERNAL MEDICINE

## 2023-10-04 PROCEDURE — 82306 VITAMIN D 25 HYDROXY: CPT

## 2023-10-04 PROCEDURE — 90686 IIV4 VACC NO PRSV 0.5 ML IM: CPT | Performed by: INTERNAL MEDICINE

## 2023-10-04 PROCEDURE — 90651 9VHPV VACCINE 2/3 DOSE IM: CPT | Performed by: INTERNAL MEDICINE

## 2023-10-04 PROCEDURE — 3079F DIAST BP 80-89 MM HG: CPT | Performed by: INTERNAL MEDICINE

## 2023-10-04 PROCEDURE — 86803 HEPATITIS C AB TEST: CPT

## 2023-10-04 PROCEDURE — 90471 IMMUNIZATION ADMIN: CPT | Performed by: INTERNAL MEDICINE

## 2023-10-04 PROCEDURE — 83036 HEMOGLOBIN GLYCOSYLATED A1C: CPT

## 2023-10-04 PROCEDURE — 84443 ASSAY THYROID STIM HORMONE: CPT

## 2023-10-04 PROCEDURE — 99215 OFFICE O/P EST HI 40 MIN: CPT | Mod: 25 | Performed by: INTERNAL MEDICINE

## 2023-10-04 PROCEDURE — 82607 VITAMIN B-12: CPT

## 2023-10-04 PROCEDURE — 36415 COLL VENOUS BLD VENIPUNCTURE: CPT

## 2023-10-04 PROCEDURE — 80048 BASIC METABOLIC PNL TOTAL CA: CPT

## 2023-10-04 PROCEDURE — 90472 IMMUNIZATION ADMIN EACH ADD: CPT | Performed by: INTERNAL MEDICINE

## 2023-10-04 PROCEDURE — 80061 LIPID PANEL: CPT

## 2023-10-04 RX ORDER — VENLAFAXINE HYDROCHLORIDE 37.5 MG/1
37.5 CAPSULE, EXTENDED RELEASE ORAL DAILY
Qty: 30 CAPSULE | Refills: 3 | Status: SHIPPED | OUTPATIENT
Start: 2023-10-04 | End: 2023-10-31

## 2023-10-04 ASSESSMENT — FIBROSIS 4 INDEX: FIB4 SCORE: 0.56

## 2023-10-04 ASSESSMENT — PATIENT HEALTH QUESTIONNAIRE - PHQ9: CLINICAL INTERPRETATION OF PHQ2 SCORE: 0

## 2023-10-04 NOTE — ASSESSMENT & PLAN NOTE
Chronic condition being treated with omeprazole 20 Mg daily.  Patient denies nausea vomiting dysphagia or unexplained weight loss.

## 2023-10-04 NOTE — ASSESSMENT & PLAN NOTE
Chronic condition.  Patient presently taking hydrochlorothiazide 25 mg daily.  Patient tolerating medication well

## 2023-10-04 NOTE — ASSESSMENT & PLAN NOTE
Chronic condition.  The patient takes singular 10 mg daily.  Flonase nasal spray as needed.  Currently patient asymptomatic.

## 2023-10-04 NOTE — ASSESSMENT & PLAN NOTE
Chronic condition.  Patient reported that she has gained weight recently.  Patient denies significant change in her diet.  Patient stated that she would resume her exercise activity.      Counseling on health consequences related to obesity.  Discussed with the patient regarding diet, exercise, and lifestyle modification to help achieve and maintain healthy weight

## 2023-10-04 NOTE — ASSESSMENT & PLAN NOTE
This is a new condition.  The patient reported that she would get emotional very easily.  Patient reported easily trying over trivial things  Patient denies SI.  Patient was seen by therapist previously.

## 2023-10-04 NOTE — PROGRESS NOTES
PRIMARY CARE CLINIC VISIT        Chief Complaint   Patient presents with    Follow-Up      Weight gain  Mood changes  Hypertension  Thyroid nodule  Vitamin D deficiency  Hyperlipidemia  Acid reflux  Vaccination update  Request mammogram  Allergic rhinitis        History of Present Illness     Essential hypertension  Chronic condition.  Patient presently taking hydrochlorothiazide 25 mg daily.  Patient tolerating medication well    Left thyroid nodule  Chronic condition      the patient is due for lab test and thyroid ultrasound.      Vitamin D deficiency  Chronic condition.  Patient currently not taking vitamin D.  Lab test requested.    Dyslipidemia  This is a chronic condition.  Patient currently on diet therapy.  Patient is due for lab test.    Gastroesophageal reflux disease without esophagitis  Chronic condition being treated with omeprazole 20 Mg daily.  Patient denies nausea vomiting dysphagia or unexplained weight loss.    Weight gain  Chronic condition.  Patient reported that she has gained weight recently.  Patient denies significant change in her diet.  Patient stated that she would resume her exercise activity.      Counseling on health consequences related to obesity.  Discussed with the patient regarding diet, exercise, and lifestyle modification to help achieve and maintain healthy weight          Mood disorder (HCC)  This is a new condition.  The patient reported that she would get emotional very easily.  Patient reported easily trying over trivial things  Patient denies SI.  Patient was seen by therapist previously.  Patient also sees her gynecologist.  Patient stated that she had blood test done to check hormone levels.  Patient stated that she is not in menopause.    Need for vaccination  Patient is due for influenza vaccine and HPV    Current Outpatient Medications on File Prior to Visit   Medication Sig Dispense Refill    fluticasone (FLONASE) 50 MCG/ACT nasal spray INHALE 1 SPRAY INTO AFFECTED  NOSTRIL(S) EVERY DAY. 16 mL 0    montelukast (SINGULAIR) 10 MG Tab TAKE 1 TABLET BY MOUTH EVERY DAY 90 Tablet 3    hydroCHLOROthiazide (HYDRODIURIL) 25 MG Tab TAKE 1 TABLET BY MOUTH EVERY DAY 90 Tablet 3    omeprazole (PRILOSEC) 20 MG delayed-release capsule TAKE 1 CAPSULE BY MOUTH EVERY DAY 90 Capsule 3     No current facility-administered medications on file prior to visit.        Allergies: Sulfa drugs    Current Outpatient Medications Ordered in Epic   Medication Sig Dispense Refill    venlafaxine XR (EFFEXOR XR) 37.5 MG CAPSULE SR 24 HR Take 1 Capsule by mouth every day. 30 Capsule 3    fluticasone (FLONASE) 50 MCG/ACT nasal spray INHALE 1 SPRAY INTO AFFECTED NOSTRIL(S) EVERY DAY. 16 mL 0    montelukast (SINGULAIR) 10 MG Tab TAKE 1 TABLET BY MOUTH EVERY DAY 90 Tablet 3    hydroCHLOROthiazide (HYDRODIURIL) 25 MG Tab TAKE 1 TABLET BY MOUTH EVERY DAY 90 Tablet 3    omeprazole (PRILOSEC) 20 MG delayed-release capsule TAKE 1 CAPSULE BY MOUTH EVERY DAY 90 Capsule 3     No current [facility]-ordered facility-administered medications on file.       Past Medical History:   Diagnosis Date    Bronchitis     Chickenpox     GERD (gastroesophageal reflux disease)     Goiter 4/30/2019    Hypertension     Melanoma (HCC)     Migraine     Mild depression 3/19/2019    Nasal drainage     Tonsillitis     Venereal disease        Past Surgical History:   Procedure Laterality Date    SKIN RESURFACING      removal melanoma as kid       Family History   Problem Relation Age of Onset    Heart Disease Maternal Grandmother     Heart Disease Paternal Grandfather         heart attack    Hypertension Mother     Diabetes Mother     Obesity Mother     Hypertension Father     Obesity Father     Sleep Apnea Father     Alcohol/Drug Maternal Grandfather     Lung Disease Paternal Grandmother     Heart Disease Paternal Grandmother         many heart attacks       Social History     Tobacco Use   Smoking Status Never   Smokeless Tobacco Never       Social  "History     Substance and Sexual Activity   Alcohol Use No       Review of systems.  As per HPI above. All other systems reviewed and negative.      Past Medical, Social, and Family history reviewed and updated in EPIC     Objective     /86   Pulse 66   Temp 37.1 °C (98.8 °F) (Temporal)   Resp 12   Ht 1.676 m (5' 6\")   Wt 104 kg (230 lb)   SpO2 97%    Body mass index is 37.12 kg/m².    General: alert in no apparent distress.  Cardiovascular: regular rate and rhythm  Pulmonary: lungs : no wheezing   Gastrointestinal: BS present. No obvious mass noted        Lab Results   Component Value Date/Time    HBA1C 5.6 03/07/2019 07:32 AM       Lab Results   Component Value Date/Time    WBC 6.9 07/01/2022 07:23 AM    HEMOGLOBIN 14.4 07/01/2022 07:23 AM    HEMATOCRIT 45.3 07/01/2022 07:23 AM    MCV 87.3 07/01/2022 07:23 AM    PLATELETCT 319 07/01/2022 07:23 AM         Lab Results   Component Value Date/Time    SODIUM 136 07/01/2022 07:23 AM    POTASSIUM 3.8 07/01/2022 07:23 AM    GLUCOSE 96 07/01/2022 07:23 AM    BUN 18 07/01/2022 07:23 AM    CREATININE 0.72 07/01/2022 07:23 AM       Lab Results   Component Value Date/Time    CHOLSTRLTOT 174 07/01/2022 07:23 AM    TRIGLYCERIDE 90 07/01/2022 07:23 AM    HDL 39 (A) 07/01/2022 07:23 AM     (H) 07/01/2022 07:23 AM       Lab Results   Component Value Date/Time    ALTSGPT 11 02/11/2022 04:37 AM             Assessment and Plan     1. Weight gain  Chronic condition.  Uncontrolled.  Recommend diet and exercise.  Refer the patient to obesity medicine specialist    2. Mood disorder (HCC)  - Referral to Psychiatry  Chronic condition.  Uncontrolled.  Counseling given in the office today.  Prescribed venlafaxine to take 1 a day.  Potential side effect of medication discussed with the patient    3. Essential hypertension  Chronic stable condition.  Continue hydrochlorothiazide 25 mg daily    4. Left thyroid nodule  Chronic condition.  Current status unclear.  Lab test and " ultrasound requested.  Follow-up after these are done.    - TSH; Future  - US-THYROID; Future    5. Vitamin D deficiency  - VITAMIN D,25 HYDROXY (DEFICIENCY); Future  Chronic condition.  Current status unclear.  Lab test ordered for follow-up    6. Dyslipidemia  - Lipid Profile; Future  Chronic condition.  Current status unclear.  Blood test requested for follow-up.    7. Gastroesophageal reflux disease without esophagitis  Chronic condition.  Continue omeprazole 20 Mg daily    8. Encounter for screening mammogram for malignant neoplasm of breast  - MA-SCREENING MAMMO BILAT W/TOMOSYNTHESIS W/CAD; Future    9. Well adult exam  - CBC WITHOUT DIFFERENTIAL; Future  - VITAMIN B12; Future  - HEMOGLOBIN A1C; Future  - Basic Metabolic Panel; Future    10. Seasonal allergic rhinitis due to pollen  Chronic stable condition.  Continue fluticasone nasal spray and Singulair 10 mg daily    11. Need for hepatitis C screening test  - HEP C VIRUS ANTIBODY; Future    12. Need for vaccination  - 9VHPV Vaccine 2-3 Dose IM  - INFLUENZA VACCINE QUAD INJ (PF)    Other orders  - venlafaxine XR (EFFEXOR XR) 37.5 MG CAPSULE SR 24 HR; Take 1 Capsule by mouth every day.  Dispense: 30 Capsule; Refill: 3    Attestation: I spent:  46   min -  That includes time for chart review before the visit, the actual patient visit, and time spent on documentation in EMR after the visit.  Chart review/prep, review of other providers' records, imaging/lab review, face-to-face time for history/examination, pt's counseling/education, ordering, prescribing,  review of results/meds/ treatment plan with patient, and care coordination.    The patient is of extensive complexity. This pt is at high risk for complications and morbidity.                   Healthcare Maintenance       Health Maintenance Due   Topic Date Due    Hepatitis C Screening  Never done    HPV Vaccines (3 - 3-dose SCDM series) 05/06/2020    Cervical Cancer Screening  10/08/2022    COVID-19  Vaccine (4 - Pfizer series) 12/05/2022    Influenza Vaccine (1) 09/01/2023               Please note that this dictation was created using voice recognition software. I have made every reasonable attempt to correct obvious errors, but I expect that there are errors of grammar and possibly content that I did not discover before finalizing the note.    Tommy Golden MD  Internal Medicine  [facility]

## 2023-10-10 ENCOUNTER — APPOINTMENT (OUTPATIENT)
Dept: RADIOLOGY | Facility: MEDICAL CENTER | Age: 43
End: 2023-10-10
Attending: INTERNAL MEDICINE
Payer: COMMERCIAL

## 2023-10-10 DIAGNOSIS — Z12.31 ENCOUNTER FOR SCREENING MAMMOGRAM FOR MALIGNANT NEOPLASM OF BREAST: ICD-10-CM

## 2023-10-10 PROCEDURE — 77063 BREAST TOMOSYNTHESIS BI: CPT

## 2023-10-31 RX ORDER — VENLAFAXINE HYDROCHLORIDE 37.5 MG/1
37.5 CAPSULE, EXTENDED RELEASE ORAL DAILY
Qty: 90 CAPSULE | Refills: 2 | Status: SHIPPED | OUTPATIENT
Start: 2023-10-31

## 2023-11-02 ENCOUNTER — HOSPITAL ENCOUNTER (OUTPATIENT)
Dept: RADIOLOGY | Facility: MEDICAL CENTER | Age: 43
End: 2023-11-02
Attending: INTERNAL MEDICINE
Payer: COMMERCIAL

## 2023-11-02 DIAGNOSIS — E04.1 LEFT THYROID NODULE: Chronic | ICD-10-CM

## 2023-11-02 PROCEDURE — 76536 US EXAM OF HEAD AND NECK: CPT

## 2023-11-20 RX ORDER — FLUTICASONE PROPIONATE 50 MCG
SPRAY, SUSPENSION (ML) NASAL
Qty: 16 ML | Refills: 0 | Status: SHIPPED | OUTPATIENT
Start: 2023-11-20 | End: 2023-12-22

## 2023-12-19 RX ORDER — HYDROCHLOROTHIAZIDE 25 MG/1
25 TABLET ORAL
Qty: 90 TABLET | Refills: 0 | Status: SHIPPED | OUTPATIENT
Start: 2023-12-19 | End: 2024-03-25

## 2023-12-19 RX ORDER — MONTELUKAST SODIUM 10 MG/1
10 TABLET ORAL
Qty: 90 TABLET | Refills: 0 | Status: SHIPPED | OUTPATIENT
Start: 2023-12-19 | End: 2024-03-25

## 2023-12-19 RX ORDER — OMEPRAZOLE 20 MG/1
20 CAPSULE, DELAYED RELEASE ORAL
Qty: 90 CAPSULE | Refills: 0 | Status: SHIPPED | OUTPATIENT
Start: 2023-12-19 | End: 2024-03-25

## 2023-12-22 RX ORDER — FLUTICASONE PROPIONATE 50 MCG
SPRAY, SUSPENSION (ML) NASAL
Qty: 16 ML | Refills: 0 | Status: SHIPPED | OUTPATIENT
Start: 2023-12-22 | End: 2024-01-19

## 2024-01-19 RX ORDER — FLUTICASONE PROPIONATE 50 MCG
SPRAY, SUSPENSION (ML) NASAL
Qty: 48 ML | Refills: 1 | Status: SHIPPED | OUTPATIENT
Start: 2024-01-19

## 2024-01-19 NOTE — TELEPHONE ENCOUNTER
Received request via: Pharmacy    Was the patient seen in the last year in this department? Yes    Does the patient have an active prescription (recently filled or refills available) for medication(s) requested? No    Pharmacy Name:  [facility]/pharmacy #[id number] - [place], [place] - [address]     Does the patient have longterm Plus and need 100 day supply (blood pressure, diabetes and cholesterol meds only)? Patient does not have SCP

## 2024-03-25 RX ORDER — HYDROCHLOROTHIAZIDE 25 MG/1
25 TABLET ORAL
Qty: 90 TABLET | Refills: 0 | Status: SHIPPED | OUTPATIENT
Start: 2024-03-25

## 2024-03-25 RX ORDER — OMEPRAZOLE 20 MG/1
20 CAPSULE, DELAYED RELEASE ORAL
Qty: 90 CAPSULE | Refills: 0 | Status: SHIPPED | OUTPATIENT
Start: 2024-03-25

## 2024-03-25 RX ORDER — MONTELUKAST SODIUM 10 MG/1
10 TABLET ORAL
Qty: 90 TABLET | Refills: 0 | Status: SHIPPED | OUTPATIENT
Start: 2024-03-25

## 2024-03-25 NOTE — TELEPHONE ENCOUNTER
Received request via: Pharmacy    Was the patient seen in the last year in this department? Yes    Does the patient have an active prescription (recently filled or refills available) for medication(s) requested? No    Pharmacy Name:   [facility]/pharmacy #[id number] - [place], [place] - [address] [phone]               Does the patient have detention Plus and need 100 day supply (blood pressure, diabetes and cholesterol meds only)? Patient does not have SCP

## 2024-05-20 ENCOUNTER — HOSPITAL ENCOUNTER (EMERGENCY)
Facility: MEDICAL CENTER | Age: 44
End: 2024-05-20
Attending: EMERGENCY MEDICINE
Payer: COMMERCIAL

## 2024-05-20 VITALS
WEIGHT: 231.48 LBS | SYSTOLIC BLOOD PRESSURE: 170 MMHG | RESPIRATION RATE: 14 BRPM | HEIGHT: 66 IN | OXYGEN SATURATION: 97 % | BODY MASS INDEX: 37.2 KG/M2 | TEMPERATURE: 98.4 F | HEART RATE: 77 BPM | DIASTOLIC BLOOD PRESSURE: 129 MMHG

## 2024-05-20 DIAGNOSIS — T30.0 BURN: ICD-10-CM

## 2024-05-20 RX ORDER — HYDROCODONE BITARTRATE AND ACETAMINOPHEN 5; 325 MG/1; MG/1
1 TABLET ORAL ONCE
Status: COMPLETED | OUTPATIENT
Start: 2024-05-20 | End: 2024-05-20

## 2024-05-20 RX ORDER — MORPHINE SULFATE 15 MG/1
15 TABLET ORAL EVERY 6 HOURS PRN
Qty: 10 TABLET | Refills: 0 | Status: SHIPPED | OUTPATIENT
Start: 2024-05-20 | End: 2024-05-23

## 2024-05-20 RX ORDER — NAPROXEN 500 MG/1
500 TABLET ORAL 2 TIMES DAILY WITH MEALS
Qty: 60 TABLET | Refills: 0 | Status: SHIPPED | OUTPATIENT
Start: 2024-05-20 | End: 2024-05-29

## 2024-05-20 RX ORDER — IBUPROFEN 600 MG/1
600 TABLET ORAL ONCE
Status: COMPLETED | OUTPATIENT
Start: 2024-05-20 | End: 2024-05-20

## 2024-05-20 RX ADMIN — HYDROCODONE BITARTRATE AND ACETAMINOPHEN 1 TABLET: 5; 325 TABLET ORAL at 08:56

## 2024-05-20 RX ADMIN — IBUPROFEN 600 MG: 600 TABLET, FILM COATED ORAL at 09:05

## 2024-05-20 NOTE — Clinical Note
Татьяна Velez was seen and treated in our emergency department on 5/20/2024.  She may return to work on 05/22/2024.       If you have any questions or concerns, please don't hesitate to call.      Amber Jacobsen M.D.
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
## 2024-05-20 NOTE — ED NOTES
Pt states she grabbed her curling iron with her left hand. Pt states she has pain in her fingers and palm on left hand. Pt states she did not take her blood pressure medication this morning.
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
## 2024-05-29 ENCOUNTER — OFFICE VISIT (OUTPATIENT)
Dept: URGENT CARE | Facility: CLINIC | Age: 44
End: 2024-05-29
Payer: COMMERCIAL

## 2024-05-29 VITALS
RESPIRATION RATE: 18 BRPM | OXYGEN SATURATION: 99 % | TEMPERATURE: 98 F | BODY MASS INDEX: 37.93 KG/M2 | WEIGHT: 236 LBS | SYSTOLIC BLOOD PRESSURE: 119 MMHG | DIASTOLIC BLOOD PRESSURE: 80 MMHG | HEART RATE: 78 BPM | HEIGHT: 66 IN

## 2024-05-29 DIAGNOSIS — T23.292D PARTIAL THICKNESS BURN OF MULTIPLE SITES OF LEFT HAND, SUBSEQUENT ENCOUNTER: ICD-10-CM

## 2024-05-30 NOTE — PROGRESS NOTES
Татьяна Velez is a 44 y.o. female who presents for Hand Injury (Burn hand 8 days ago )      HPI  This is a new problem. Татьяна Velez is a 44 y.o. patient who presents to urgent care with c/o: Treated in the emergency room for burn to her left hand 8 days ago (05-).  She has been treating her wounds with over-the-counter Neosporin and keeping them covered with a Band-Aid.  The blisters have all leaked clear fluid.  The blister on her fourth finger has gotten very hard.  It is no longer filled with fluid.  It looks different than the other blisters the other blisters on her fifth finger and her thumb are white in color and soft.  She is concerned that it may not be healing well and that she should be treating it in a different way.  Her pain is minimal.  She denies fever chills.  She is left-hand dominant.  She does have prediabetes.  She was told to follow-up with her primary care provider but was unable to get an appointment until July..    ROS See HPI    Allergies:       Allergies   Allergen Reactions    Sulfa Drugs Unspecified       PMSFS Hx:  Past Medical History:   Diagnosis Date    Bronchitis     Chickenpox     GERD (gastroesophageal reflux disease)     Goiter 4/30/2019    Hypertension     Melanoma (HCC)     Migraine     Mild depression 3/19/2019    Nasal drainage     Tonsillitis     Venereal disease      Past Surgical History:   Procedure Laterality Date    SKIN RESURFACING      removal melanoma as kid     Family History   Problem Relation Age of Onset    Heart Disease Maternal Grandmother     Heart Disease Paternal Grandfather         heart attack    Hypertension Mother     Diabetes Mother     Obesity Mother     Hypertension Father     Obesity Father     Sleep Apnea Father     Alcohol/Drug Maternal Grandfather     Lung Disease Paternal Grandmother     Heart Disease Paternal Grandmother         many heart attacks     Social History     Tobacco Use    Smoking status: Never     "Smokeless tobacco: Never   Substance Use Topics    Alcohol use: No     Comment: denies       Problems:   Patient Active Problem List   Diagnosis    Essential hypertension    Numerous moles    Vitamin D deficiency    Vitamin B12 deficiency    Dyslipidemia    Seasonal allergic rhinitis due to pollen    Gastroesophageal reflux disease without esophagitis    Left thyroid nodule    RLS (restless legs syndrome)    BMI 35.0-35.9,adult    Iron deficiency    Weight gain    Mood disorder (HCC)    Need for vaccination    Prediabetes       Medications:   Current Outpatient Medications on File Prior to Visit   Medication Sig Dispense Refill    montelukast (SINGULAIR) 10 MG Tab TAKE 1 TABLET BY MOUTH EVERY DAY 90 Tablet 0    omeprazole (PRILOSEC) 20 MG delayed-release capsule TAKE 1 CAPSULE BY MOUTH EVERY DAY 90 Capsule 0    hydroCHLOROthiazide 25 MG Tab TAKE 1 TABLET BY MOUTH EVERY DAY 90 Tablet 0    fluticasone (FLONASE) 50 MCG/ACT nasal spray INHALE 1 SPRAY INTO AFFECTED NOSTRIL(S) EVERY DAY. 48 mL 1    naproxen (NAPROSYN) 500 MG Tab Take 1 Tablet by mouth 2 times a day with meals. 60 Tablet 0    venlafaxine XR (EFFEXOR XR) 37.5 MG CAPSULE SR 24 HR TAKE 1 CAPSULE BY MOUTH EVERY DAY 90 Capsule 2     No current facility-administered medications on file prior to visit.        Objective:     /80   Pulse 78   Temp 36.7 °C (98 °F) (Temporal)   Resp 18   Ht 1.676 m (5' 6\")   Wt 107 kg (236 lb)   SpO2 99%   BMI 38.09 kg/m²     Physical Exam  Vitals and nursing note reviewed.   Constitutional:       Appearance: Normal appearance. She is normal weight.   Cardiovascular:      Rate and Rhythm: Normal rate.      Pulses: Normal pulses.   Pulmonary:      Effort: Pulmonary effort is normal.   Skin:     General: Skin is warm.      Capillary Refill: Capillary refill takes less than 2 seconds.      Comments: Well-healing burns to her left hand fifth finger fourth finger and thumb.  There is no erythema.  The blister on the left " fourth finger has flattened.  It does appear very dry.  No signs of infection.   Neurological:      Mental Status: She is alert and oriented to person, place, and time.   Psychiatric:         Mood and Affect: Mood normal.         Behavior: Behavior normal.     Wounds cleansed, Polysporin ointment applied, covered with Band-Aid    Assessment /Associated Orders:      1. Partial thickness burn of multiple sites of left hand, subsequent encounter              Medical Decision Making:    Татьяна is a very pleasant 44 y.o. female who is clinically stable at today's acute urgent care visit.  No acute distress noted.  VSS. Appropriate for outpatient care at this time.   Acute problem today with uncertain prognosis.   Wounds are well-healing.  Advised continued wound care with over-the-counter Polysporin ointment and Band-Aids to keep the skin intact.  Discussed Dx, management options (risks,benefits, and alternatives to planned treatment), natural progression and supportive care.  Expressed understanding and the treatment plan was agreed upon.   Questions were encouraged and answered   Return to urgent care prn if new or worsening sx or if there is no improvement in condition prn.            Please note that this dictation was created using voice recognition software. I have worked with consultants from the vendor as well as technical experts from [facility] [employer] to optimize the interface. I have made every reasonable attempt to correct obvious errors, but I expect that there are errors of grammar and possibly content that I did not discover before finalizing the note.  This note was electronically signed by provider

## 2024-06-25 RX ORDER — OMEPRAZOLE 20 MG/1
20 CAPSULE, DELAYED RELEASE ORAL
Qty: 90 CAPSULE | Refills: 0 | Status: SHIPPED | OUTPATIENT
Start: 2024-06-25

## 2024-06-25 RX ORDER — MONTELUKAST SODIUM 10 MG/1
10 TABLET ORAL
Qty: 90 TABLET | Refills: 0 | Status: SHIPPED | OUTPATIENT
Start: 2024-06-25

## 2024-06-25 RX ORDER — HYDROCHLOROTHIAZIDE 25 MG/1
25 TABLET ORAL
Qty: 90 TABLET | Refills: 0 | Status: SHIPPED | OUTPATIENT
Start: 2024-06-25